# Patient Record
Sex: FEMALE | Race: WHITE
[De-identification: names, ages, dates, MRNs, and addresses within clinical notes are randomized per-mention and may not be internally consistent; named-entity substitution may affect disease eponyms.]

---

## 2017-12-16 ENCOUNTER — HOSPITAL ENCOUNTER (INPATIENT)
Dept: HOSPITAL 61 - 5 NORTH | Age: 57
LOS: 3 days | Discharge: HOME | DRG: 853 | End: 2017-12-19
Attending: INTERNAL MEDICINE | Admitting: INTERNAL MEDICINE
Payer: COMMERCIAL

## 2017-12-16 ENCOUNTER — HOSPITAL ENCOUNTER (EMERGENCY)
Dept: HOSPITAL 63 - ER | Age: 57
Discharge: ANOTHER HOSPITAL | End: 2017-12-16
Payer: COMMERCIAL

## 2017-12-16 VITALS — SYSTOLIC BLOOD PRESSURE: 113 MMHG | DIASTOLIC BLOOD PRESSURE: 54 MMHG

## 2017-12-16 VITALS
SYSTOLIC BLOOD PRESSURE: 131 MMHG | DIASTOLIC BLOOD PRESSURE: 76 MMHG | DIASTOLIC BLOOD PRESSURE: 76 MMHG | DIASTOLIC BLOOD PRESSURE: 76 MMHG | SYSTOLIC BLOOD PRESSURE: 131 MMHG | DIASTOLIC BLOOD PRESSURE: 76 MMHG | SYSTOLIC BLOOD PRESSURE: 131 MMHG | SYSTOLIC BLOOD PRESSURE: 131 MMHG

## 2017-12-16 VITALS — DIASTOLIC BLOOD PRESSURE: 54 MMHG | SYSTOLIC BLOOD PRESSURE: 116 MMHG

## 2017-12-16 VITALS — WEIGHT: 190 LBS | BODY MASS INDEX: 32.44 KG/M2 | HEIGHT: 64 IN

## 2017-12-16 DIAGNOSIS — K85.10: ICD-10-CM

## 2017-12-16 DIAGNOSIS — N20.0: ICD-10-CM

## 2017-12-16 DIAGNOSIS — K80.62: ICD-10-CM

## 2017-12-16 DIAGNOSIS — A41.9: Primary | ICD-10-CM

## 2017-12-16 DIAGNOSIS — R74.0: ICD-10-CM

## 2017-12-16 DIAGNOSIS — K59.00: ICD-10-CM

## 2017-12-16 DIAGNOSIS — K85.10: Primary | ICD-10-CM

## 2017-12-16 LAB
ALBUMIN SERPL-MCNC: 4 G/DL (ref 3.4–5)
ALBUMIN/GLOB SERPL: 1.1 {RATIO} (ref 1–1.7)
ALP SERPL-CCNC: 310 U/L (ref 46–116)
ALT SERPL-CCNC: 758 U/L (ref 14–59)
AMORPH SED URNS QL MICRO: PRESENT /HPF
ANION GAP SERPL CALC-SCNC: 11 MMOL/L (ref 6–14)
APTT PPP: (no result) S
AST SERPL-CCNC: 561 U/L (ref 15–37)
BACTERIA #/AREA URNS HPF: (no result) /HPF
BASOPHILS # BLD AUTO: 0.1 X10^3/UL (ref 0–0.2)
BASOPHILS NFR BLD: 1 % (ref 0–3)
BILIRUB SERPL-MCNC: 4.2 MG/DL (ref 0.2–1)
BILIRUB UR QL STRIP: (no result)
BUN/CREAT SERPL: 10 (ref 6–20)
CA-I SERPL ISE-MCNC: 9 MG/DL (ref 7–20)
CALCIUM SERPL-MCNC: 9.6 MG/DL (ref 8.5–10.1)
CHLORIDE SERPL-SCNC: 106 MMOL/L (ref 98–107)
CO2 SERPL-SCNC: 26 MMOL/L (ref 21–32)
CREAT SERPL-MCNC: 0.9 MG/DL (ref 0.6–1)
EOSINOPHIL NFR BLD: 0.4 X10^3/UL (ref 0–0.7)
EOSINOPHIL NFR BLD: 3 % (ref 0–3)
ERYTHROCYTE [DISTWIDTH] IN BLOOD BY AUTOMATED COUNT: 13.4 % (ref 11.5–14.5)
FIBRINOGEN PPP-MCNC: CLEAR MG/DL
GFR SERPLBLD BASED ON 1.73 SQ M-ARVRAT: 64.8 ML/MIN
GLOBULIN SER-MCNC: 3.7 G/DL (ref 2.2–3.8)
GLUCOSE SERPL-MCNC: 112 MG/DL (ref 70–99)
GLUCOSE UR STRIP-MCNC: (no result) MG/DL
HCT VFR BLD CALC: 44.9 % (ref 36–47)
HGB BLD-MCNC: 15.3 G/DL (ref 12–15.5)
LYMPHOCYTES # BLD: 2 X10^3/UL (ref 1–4.8)
LYMPHOCYTES NFR BLD AUTO: 16 % (ref 24–48)
MCH RBC QN AUTO: 32 PG (ref 25–35)
MCHC RBC AUTO-ENTMCNC: 34 G/DL (ref 31–37)
MCV RBC AUTO: 93 FL (ref 79–100)
MONO #: 1 X10^3/UL (ref 0–1.1)
MONOCYTES NFR BLD: 8 % (ref 0–9)
NEUT #: 9 X10^3UL (ref 1.8–7.7)
NEUTROPHILS NFR BLD AUTO: 72 % (ref 31–73)
NITRITE UR QL STRIP: (no result)
PLATELET # BLD AUTO: 333 X10^3/UL (ref 140–400)
POTASSIUM SERPL-SCNC: 3.8 MMOL/L (ref 3.5–5.1)
PROT SERPL-MCNC: 7.7 G/DL (ref 6.4–8.2)
RBC # BLD AUTO: 4.81 X10^6/UL (ref 3.5–5.4)
RBC #/AREA URNS HPF: (no result) /HPF (ref 0–2)
SODIUM SERPL-SCNC: 143 MMOL/L (ref 136–145)
SP GR UR STRIP: 1.02
SQUAMOUS #/AREA URNS LPF: (no result) /LPF
UROBILINOGEN UR-MCNC: 1 MG/DL
WBC # BLD AUTO: 12.5 X10^3/UL (ref 4–11)

## 2017-12-16 PROCEDURE — 85025 COMPLETE CBC W/AUTO DIFF WBC: CPT

## 2017-12-16 PROCEDURE — 96366 THER/PROPH/DIAG IV INF ADDON: CPT

## 2017-12-16 PROCEDURE — 36415 COLL VENOUS BLD VENIPUNCTURE: CPT

## 2017-12-16 PROCEDURE — 82977 ASSAY OF GGT: CPT

## 2017-12-16 PROCEDURE — S0028 INJECTION, FAMOTIDINE, 20 MG: HCPCS

## 2017-12-16 PROCEDURE — 83690 ASSAY OF LIPASE: CPT

## 2017-12-16 PROCEDURE — 96365 THER/PROPH/DIAG IV INF INIT: CPT

## 2017-12-16 PROCEDURE — 88304 TISSUE EXAM BY PATHOLOGIST: CPT

## 2017-12-16 PROCEDURE — 80053 COMPREHEN METABOLIC PANEL: CPT

## 2017-12-16 PROCEDURE — 87086 URINE CULTURE/COLONY COUNT: CPT

## 2017-12-16 PROCEDURE — 81001 URINALYSIS AUTO W/SCOPE: CPT

## 2017-12-16 PROCEDURE — 80061 LIPID PANEL: CPT

## 2017-12-16 PROCEDURE — 99285 EMERGENCY DEPT VISIT HI MDM: CPT

## 2017-12-16 PROCEDURE — 80048 BASIC METABOLIC PNL TOTAL CA: CPT

## 2017-12-16 PROCEDURE — 74300 X-RAY BILE DUCTS/PANCREAS: CPT

## 2017-12-16 PROCEDURE — 74176 CT ABD & PELVIS W/O CONTRAST: CPT

## 2017-12-16 PROCEDURE — C9113 INJ PANTOPRAZOLE SODIUM, VIA: HCPCS

## 2017-12-16 RX ADMIN — ACETAMINOPHEN PRN MG: 325 TABLET, FILM COATED ORAL at 20:28

## 2017-12-16 RX ADMIN — ENOXAPARIN SODIUM SCH MG: 40 INJECTION SUBCUTANEOUS at 20:28

## 2017-12-16 RX ADMIN — BACITRACIN SCH MLS/HR: 5000 INJECTION, POWDER, FOR SOLUTION INTRAMUSCULAR at 18:07

## 2017-12-16 RX ADMIN — PIPERACILLIN SODIUM AND TAZOBACTAM SODIUM SCH GM: 3; .375 INJECTION, POWDER, FOR SOLUTION INTRAVENOUS at 23:44

## 2017-12-16 RX ADMIN — PIPERACILLIN SODIUM AND TAZOBACTAM SODIUM SCH GM: 3; .375 INJECTION, POWDER, FOR SOLUTION INTRAVENOUS at 18:07

## 2017-12-16 NOTE — RAD
CT abdomen and pelvis without contrast 12/16/2017



Clinical indication: Right lower quadrant abdominal pain. [place] comparison:

None.



Technique: Multiple CT images of the abdomen and pelvis were obtained without

contrast.



PQRS Compliance Statement:



One or more of the following individualized dose reduction techniques were

utilized for this examination:

1. Automated exposure control

2. Adjustment of the mA and/or kV according to patient size

3. Use of iterative reconstruction technique



Findings:

Heart size visualized lung bases are unremarkable.



Evaluation of the solid abdominopelvic viscera, lymphadenopathy and

vasculature is limited in the absence of intravenous contrast.



Unenhanced contours of the liver, spleen, adrenal glands, pancreas and right

kidney are grossly unremarkable. There is a small 1 cm left inferior pole

renal cyst. No hydronephrosis. No nephrolithiasis.



Gallbladder is mildly dilated with cholelithiasis. No intra or extrahepatic

bile or ductal dilatation.



Abdominal aorta is normal in caliber.



Small and large bowel loops are normal in caliber without obstruction. No

abdominal free fluid. Appendix is normal in appearance. No pneumoperitoneum.



Urinary bladder is decompressed. Uterus is present though incompletely

evaluated by CT. No iliac or inguinal lymphadenopathy. No pelvic free fluid.



There is bilateral L5 spondylolysis and grade 2 and shows thesis L5 on S1.



Impression:

1. Mildly dilated gallbladder with cholelithiasis. Findings are equivocal for

acute cholecystitis.

2. No nephrolithiasis or hydronephrosis.

## 2017-12-16 NOTE — PDOC1
History and Physical


Date of Admission


Date of Admission


DATE: 12/16/17 


TIME: 15:44





Identification/Chief Complaint


Chief Complaint


abdomen pain


Problems:  





Source


Source:  Caregiver, Chart review, Patient





History of Present Illness


History of Present Illness


pt presented to the ER at [place] with a few days of abdominal pain,  mid 

abdomen pain, she thought it felt like a kidney problem at first.  She was 

taking ibuprofen with some good benefit to her pain.  Came to the ER as pain 

was not improving much. Mid abd pain, 6/10


CT scan abdomen showed renal stones,  possible justice


Abx given at OSH,  white count up and she reported fever and chills at home


since traveling to this hospital, her pain is much improved, less abd pain


she drinks almost no alcohol,  has no sick contacts,   works nights, but is 

usually very healthy


has had no change in stools





Past Medical History


Cardiovascular:  No pertinent hx


Pulmonary:  No pertinent hx


GI:  No pertinent hx


Heme/Onc:  No pertinent hx


Psych:  No pertinent hx


Rheumatologic:  No pertinent hx


Infectious disease:  No pertinent hx


ENT:  No pertinent hx


Renal/:  No pertinent hx


Endocrine:  No pertinent hx


Dermatology:  No pertinent hx





Past Surgical History


Past Surgical History:  No pertinent history





Family History


Family History:  No Significant





Social History


Smoke:  No


ALCOHOL:  rare


Drugs:  None





Allergies


Allergies:  


Coded Allergies:  


     No Known Drug Allergies (Unverified , 12/16/17)





ROS


General:  YES: Night Sweats, Fatigue, 


   No: Chills, Malaise, Appetite, Other


PSYCHOLOGICAL ROS:  No: Concentration difficultie, Decreased libido, Depression

, Disorientation, Hallucinations, Hostility, Irritablity, Memory difficulties, 

Mood Swings, Obsessive thoughts, Physical abuse, Sexual abuse, Sleep 

disturbances, Suicidal ideation, Other


Eyes:  No Blurry vision, No Decreased vision, No Double vision, No Dry eyes, No 

Excessive tearing, No Eye Pain, No Itchy Eyes, No Loss of vision, No Photophobia

, No Scotomata, No Uses contacts, No Uses glasses, No Other


HEENT:  YES: Heacaches, 


   No: Visual Changes, Hearing change, Nasal congestion, Nasal discharge, Oral 

lesions, Sinus pain, Sore Throat, Epistaxis, Sneezing, Snoring, Tinnitus, 

Vertigo, Vocal changes, Other


Respiratory:  No: Cough, Hemoptysis, Orthopnea, Pleuritic Pain, Shortness of 

breath, SOB with excertion, Sputum Changes, Stridor, Tachypnea, Wheezing, Other


Cardiovascular:  No Chest Pain, No Palpitations, No Orthopnea, No Paroxysmal 

Noc. Dyspnea, No Edema, No Lt Headedness, No Other


Gastrointestinal:  Yes Nausea, Yes Abdominal Pain, 


   No Vomiting, No Diarrhea, No Constipation, No Melena, No Hematochezia, No 

Other


Genitourinary:  No Dysuria, No Frequency, No Incontinence, No Hematuria, No 

Retention, No Discharge, No Urgency, No Pain, No Flank Pain, No Other, No , No 

, No , No , No , No , No 


Musculoskeletal:  No Gait Disturbance, No Joint Pain, No Joint Stiffness, No 

Joint Swelling, No Muscle Pain, No Muscular Weakness, No Pain In:, No Swelling 

In:, No Other


Neurological:  No Behavorial Changes, No Bowel/Bladder ControlChng, No Confusion

, No Dizziness, No Gait Disturbance, No Headaches, No Impaired Coord/balance, 

No Memory Loss, No Numbness/Tingling, No Seizures, No Speech Problems, No 

Tremors, No Visual Changes, No Weakness, No Other


Skin:  No Dry Skin, No Eczema, No Hair Changes, No Lumps, No Mole Changes, No 

Mottling, No Nail Changes, No Pruritus, No Rash, No Skin Lesion Changes, No 

Other, No Acne





Physical Exam


General:  Alert, Oriented X3, Cooperative, No acute distress


HEENT:  Atraumatic, PERRLA, Mucous membr. moist/pink


Lungs:  Clear to auscultation, Normal air movement


Heart:  S1S2, no gallops, no murmurs


Abdomen:  Normal bowel sounds, Soft


Extremities:  No clubbing, No cyanosis, No edema


Skin:  No rashes, No breakdown


Neuro:  Normal speech, Normal tone, Sensation intact, Cranial nerves 3-12 NL


Psych/Mental Status:  Mental status NL, Mood NL





Labs


Labs


WBC 12.5, Hgb 15.3, plt 333





Na 143, K 3.8, Cl 106, C02 26, BUN 9, Cr 0.9, glucose 112


Lipase > 73532,     ,  





Images


Images


CT abd 12/16


Impression:


1. Mildly dilated gallbladder with cholelithiasis. Findings are equivocal for


acute cholecystitis.


2. No nephrolithiasis or hydronephrosis.





VTE Prophylaxis Ordered


VTE Prophylaxis Devices:  No


VTE Pharmacological Prophylaxi:  Yes





Assessment/Plan


Assessment/Plan


acute pancreatitis


possible gallstone pancreatitis, consult GI,  may need MRCP


has been NPO, try clears and IV fluid





white count and fever,   sepsis,  unk. source,  consider cholangitis as that is 

the only problem area,  zosyn started, will continue





consult gen surg.  may benefit from cholecystectomy at some point





admit











BUDDY ROBLERO MD Dec 16, 2017 15:46

## 2017-12-16 NOTE — ED.ADGEN
Adult General


HPI


HPI





Patient is a 57 year old female with right flank pain





Right flank pain for two days.  Also some right lower pain.  There is some pain 

to the back.  No N/V/D.  Did have some chills.  No one else is sick.  No chest 

pain, no cough.  Seen at [facility] and referred here.  Took some OTC pain 

meds. Currently the pain is moderate.  No diffuse muscle pain.





Review of Systems


Review of Systems





Constitutional: Did have some chills


Eyes: Denies change in visual acuity, redness, or eye pain 


HENT: Denies nasal congestion or sore throat 


Respiratory: Denies cough or shortness of breath 


Cardiovascular: No chest pain


GI: HPI


: Denies dysuria or hematuria 


Musculoskeletal: right flank pain


Integument: Denies rash or skin lesions 


Neurologic: Denies headache, focal weakness or sensory changes 


Endocrine: Denies polyuria or polydipsia 





All other systems were reviewed and found to be within normal limits, except as 

documented in this note.





Current Medications


Current Medications





Current Medications








 Medications


  (Trade)  Dose


 Ordered  Sig/Zohreh  Start Time


 Stop Time Status Last Admin


Dose Admin


 


 Morphine Sulfate


  (Morphine 2mg


 Syringe)  2 mg  PRN Q15MIN  PRN  12/16/17 12:00


 12/16/17 15:12 DC  


 


 


 Piperacillin Sod/


 Tazobactam Sod


  (Zosyn)  3.375 gm  STK-MED ONCE  12/16/17 13:19


 12/16/17 13:20 DC  


 


 


 Piperacillin Sod/


 Tazobactam Sod


 3.375 gm/Sodium


 Chloride  50 ml @ 


 100 mls/hr  1X  ONCE  12/16/17 12:45


 12/16/17 13:14 DC 12/16/17 12:45


100 MLS/HR


 


 Sodium Chloride  50 ml @ As


 Directed  STK-MED ONCE  12/16/17 13:19


 12/16/17 13:20 DC  


 











Allergies


Allergies





Allergies








Coded Allergies Type Severity Reaction Last Updated Verified


 


  No Known Drug Allergies    12/16/17 No











Physical Exam


Physical Exam





Constitutional: Well developed, well nourished, no acute distress, non-toxic 

appearance. 


HENT: Normocephalic, atraumatic,


Eyes: PERRLA, EOMI, conjunctiva normal, no discharge. 


Neck: Normal range of motion, no tenderness, supple, no stridor. 


Cardiovascular:Heart rate regular rhythm, no murmur 


Lungs & Thorax:  Bilateral breath sounds clear to auscultati


Abdomen: Bowel sounds normal,mild left flank tenderness, no point RLQ 

tenderness.  Mild epigastric pain.


Skin: Warm, dry, no erythema, no rash. 


Back: No tenderness, no CVA tenderness. 


Extremities: No tenderness, no cyanosis, no clubbing, ROM intact, no edema. 


Neurologic: Alert and oriented X 3, normal motor function, normal sensory 

function, no focal deficits noted. 


Psychologic: Affect normal, judgement normal, mood normal.





Current Patient Data


Vital Signs





 Vital Signs








  Date Time  Temp Pulse Resp B/P (MAP) Pulse Ox O2 Delivery O2 Flow Rate FiO2


 


12/16/17 14:15 97.6 78 16 131/76 (94) 95 Room Air  








Lab Results





 Laboratory Tests








Test


  12/16/17


11:59


 


White Blood Count


  12.5 x10^3/uL


(4.0-11.0)  H


 


Red Blood Count


  4.81 x10^6/uL


(3.50-5.40)


 


Hemoglobin


  15.3 g/dL


(12.0-15.5)


 


Hematocrit


  44.9 %


(36.0-47.0)


 


Mean Corpuscular Volume


  93 fL ()


 


 


Mean Corpuscular Hemoglobin 32 pg (25-35)  


 


Mean Corpuscular Hemoglobin


Concent 34 g/dL


(31-37)


 


Red Cell Distribution Width


  13.4 %


(11.5-14.5)


 


Platelet Count


  333 x10^3/uL


(140-400)


 


Neutrophils (%) (Auto) 72 % (31-73)  


 


Lymphocytes (%) (Auto) 16 % (24-48)  L


 


Monocytes (%) (Auto) 8 % (0-9)  


 


Eosinophils (%) (Auto) 3 % (0-3)  


 


Basophils (%) (Auto) 1 % (0-3)  


 


Neutrophils # (Auto)


  9.0 x10^3uL


(1.8-7.7)  H


 


Lymphocytes # (Auto)


  2.0 x10^3/uL


(1.0-4.8)


 


Monocytes # (Auto)


  1.0 x10^3/uL


(0.0-1.1)


 


Eosinophils # (Auto)


  0.4 x10^3/uL


(0.0-0.7)


 


Basophils # (Auto)


  0.1 x10^3/uL


(0.0-0.2)


 


Urine Collection Type Unknown  


 


Urine Color Angela  


 


Urine Clarity Clear  


 


Urine pH 5.0  


 


Urine Specific Gravity 1.025  


 


Urine Protein


  Neg


(NEG-TRACE)


 


Urine Glucose (UA)


  Neg mg/dL


(NEG)


 


Urine Ketones (Stick)


  15 mg/dL (NEG)


 


 


Urine Blood Neg (NEG)  


 


Urine Nitrite Neg (NEG)  


 


Urine Bilirubin Large (NEG)  


 


Urine Urobilinogen Dipstick


  1 mg/dL (0.2


mg/dL)


 


Urine Leukocyte Esterase Small (NEG)  


 


Urine RBC


  Rare /HPF


(0-2)


 


Urine WBC


  11-20 /HPF


(0-4)


 


Urine Squamous Epithelial


Cells Mod /LPF  


 


 


Urine Transitional Epithelial


Cells Occ /LPF  


 


 


Urine Amorphous Sediment Present /HPF  


 


Urine Bacteria


  Few /HPF


(0-FEW)


 


Urine Mucus Mod /LPF  


 


Sodium Level


  143 mmol/L


(136-145)


 


Potassium Level


  3.8 mmol/L


(3.5-5.1)


 


Chloride Level


  106 mmol/L


()


 


Carbon Dioxide Level


  26 mmol/L


(21-32)


 


Anion Gap 11 (6-14)  


 


Blood Urea Nitrogen


  9 mg/dL (7-20)


 


 


Creatinine


  0.9 mg/dL


(0.6-1.0)


 


Estimated GFR


(Cockcroft-Gault) 64.8  


 


 


BUN/Creatinine Ratio 10 (6-20)  


 


Glucose Level


  112 mg/dL


(70-99)  H


 


Calcium Level


  9.6 mg/dL


(8.5-10.1)


 


Total Bilirubin


  4.2 mg/dL


(0.2-1.0)  H


 


Aspartate Amino Transferase


(AST) 561 U/L


(15-37)  H


 


Alanine Aminotransferase (ALT)


  758 U/L


(14-59)  H


 


Alkaline Phosphatase


  310 U/L


()  H


 


Total Protein


  7.7 g/dL


(6.4-8.2)


 


Albumin


  4.0 g/dL


(3.4-5.0)


 


Albumin/Globulin Ratio 1.1 (1.0-1.7)  


 


Lipase


  73843 U/L


()  H











EKG


EKG


[]





Radiology/Procedures


Radiology/Procedures


PROCEDURE: CT ABDOMEN PELVIS WO CONTRAST





CT abdomen and pelvis without contrast 12/16/2017





Clinical indication: Right lower quadrant abdominal pain. [place] comparison:


None.





Technique: Multiple CT images of the abdomen and pelvis were obtained without


contrast.





PQRS Compliance Statement:





One or more of the following individualized dose reduction techniques were


utilized for this examination:


1. Automated exposure control


2. Adjustment of the mA and/or kV according to patient size


3. Use of iterative reconstruction technique





Findings:


Heart size visualized lung bases are unremarkable.





Evaluation of the solid abdominopelvic viscera, lymphadenopathy and


vasculature is limited in the absence of intravenous contrast.





Unenhanced contours of the liver, spleen, adrenal glands, pancreas and right


kidney are grossly unremarkable. There is a small 1 cm left inferior pole


renal cyst. No hydronephrosis. No nephrolithiasis.





Gallbladder is mildly dilated with cholelithiasis. No intra or extrahepatic


bile or ductal dilatation.





Abdominal aorta is normal in caliber.





Small and large bowel loops are normal in caliber without obstruction. No


abdominal free fluid. Appendix is normal in appearance. No pneumoperitoneum.





Urinary bladder is decompressed. Uterus is present though incompletely


evaluated by CT. No iliac or inguinal lymphadenopathy. No pelvic free fluid.





There is bilateral L5 spondylolysis and grade 2 and shows thesis L5 on S1.





Impression:


1. Mildly dilated gallbladder with cholelithiasis. Findings are equivocal for


acute cholecystitis.


2. No nephrolithiasis or hydronephrosis.














DICTATED AND SIGNED BY:     ROSETTE BRADY MD


DATE:     12/16/17 1222





CC: NICK CAMACHO MD; TOMEKA SUTTON ~


[]





Course & Med Decision Making


Course & Med Decision Making


Pertinent Labs and Imaging studies reviewed. (See chart for details)





Greatly elevated Lipase and moderately elevated LFTs.  Patient has what appears 

to be gallstone pancreatitis.  This will require transfer to [place].  This 

condition exceed the services offered at this hospital.  Patient feels much 

improved and looks rather well -- better than her labs would suggest.  I will 

dose with antibiotics and arrange for transfer for a direct admit to [place],





1245:  D/W Dr. Rizzo who accepts patient for a direct admit at [place];  we 

will arrange transportation.  Transferred to [place] in stable condition.





Dx:  gallstone pancreatitis.





Final Impression


Final Impression


[]Gallstone pancreatitis


Problems:  





Dragon Disclaimer


Dragon Disclaimer


This electronic medical record was generated, in whole or in part, using a 

voice recognition dictation system.











NICK CAMACHO MD Dec 16, 2017 12:01

## 2017-12-17 VITALS — DIASTOLIC BLOOD PRESSURE: 57 MMHG | SYSTOLIC BLOOD PRESSURE: 138 MMHG

## 2017-12-17 VITALS — DIASTOLIC BLOOD PRESSURE: 97 MMHG | SYSTOLIC BLOOD PRESSURE: 145 MMHG

## 2017-12-17 VITALS — DIASTOLIC BLOOD PRESSURE: 49 MMHG | SYSTOLIC BLOOD PRESSURE: 122 MMHG

## 2017-12-17 VITALS — DIASTOLIC BLOOD PRESSURE: 43 MMHG | SYSTOLIC BLOOD PRESSURE: 107 MMHG

## 2017-12-17 VITALS — SYSTOLIC BLOOD PRESSURE: 115 MMHG | DIASTOLIC BLOOD PRESSURE: 50 MMHG

## 2017-12-17 VITALS — SYSTOLIC BLOOD PRESSURE: 120 MMHG | DIASTOLIC BLOOD PRESSURE: 47 MMHG

## 2017-12-17 LAB
ALBUMIN SERPL-MCNC: 3.2 G/DL (ref 3.4–5)
ALBUMIN/GLOB SERPL: 1.1 {RATIO} (ref 1–1.7)
ALP SERPL-CCNC: 265 U/L (ref 46–116)
ALT SERPL-CCNC: 581 U/L (ref 14–59)
ANION GAP SERPL CALC-SCNC: 10 MMOL/L (ref 6–14)
AST SERPL-CCNC: 320 U/L (ref 15–37)
BASOPHILS # BLD AUTO: 0.1 X10^3/UL (ref 0–0.2)
BASOPHILS NFR BLD: 1 % (ref 0–3)
BILIRUB SERPL-MCNC: 2.9 MG/DL (ref 0.2–1)
BUN SERPL-MCNC: 12 MG/DL (ref 7–20)
BUN/CREAT SERPL: 15 (ref 6–20)
CALCIUM SERPL-MCNC: 8.5 MG/DL (ref 8.5–10.1)
CHLORIDE SERPL-SCNC: 109 MMOL/L (ref 98–107)
CHOLEST SERPL-MCNC: 180 MG/DL (ref 0–200)
CHOLEST/HDLC SERPL: 1.7 {RATIO}
CO2 SERPL-SCNC: 24 MMOL/L (ref 21–32)
CREAT SERPL-MCNC: 0.8 MG/DL (ref 0.6–1)
EOSINOPHIL NFR BLD: 6 % (ref 0–3)
ERYTHROCYTE [DISTWIDTH] IN BLOOD BY AUTOMATED COUNT: 13.6 % (ref 11.5–14.5)
GFR SERPLBLD BASED ON 1.73 SQ M-ARVRAT: 74.2 ML/MIN
GLOBULIN SER-MCNC: 2.8 G/DL (ref 2.2–3.8)
GLUCOSE SERPL-MCNC: 99 MG/DL (ref 70–99)
HCT VFR BLD CALC: 39.4 % (ref 36–47)
HDLC SERPL-MCNC: 105 MG/DL (ref 40–60)
HGB BLD-MCNC: 13.3 G/DL (ref 12–15.5)
LYMPHOCYTES # BLD: 2.5 X10^3/UL (ref 1–4.8)
LYMPHOCYTES NFR BLD AUTO: 35 % (ref 24–48)
MCH RBC QN AUTO: 32 PG (ref 25–35)
MCHC RBC AUTO-ENTMCNC: 34 G/DL (ref 31–37)
MCV RBC AUTO: 94 FL (ref 79–100)
MONOCYTES NFR BLD: 8 % (ref 0–9)
NEUTROPHILS NFR BLD AUTO: 50 % (ref 31–73)
NONHDLC SERPL-MCNC: 75 MG/DL (ref 0–129)
PLATELET # BLD AUTO: 284 X10^3/UL (ref 140–400)
POTASSIUM SERPL-SCNC: 4 MMOL/L (ref 3.5–5.1)
PROT SERPL-MCNC: 6 G/DL (ref 6.4–8.2)
RBC # BLD AUTO: 4.2 X10^6/UL (ref 3.5–5.4)
SODIUM SERPL-SCNC: 143 MMOL/L (ref 136–145)
TRIGL SERPL-MCNC: 36 MG/DL (ref 0–150)
WBC # BLD AUTO: 7 X10^3/UL (ref 4–11)

## 2017-12-17 RX ADMIN — BACITRACIN SCH MLS/HR: 5000 INJECTION, POWDER, FOR SOLUTION INTRAMUSCULAR at 03:35

## 2017-12-17 RX ADMIN — PANTOPRAZOLE SODIUM SCH MG: 40 INJECTION, POWDER, FOR SOLUTION INTRAVENOUS at 15:39

## 2017-12-17 RX ADMIN — ACETAMINOPHEN PRN MG: 325 TABLET, FILM COATED ORAL at 07:48

## 2017-12-17 RX ADMIN — BACITRACIN SCH MLS/HR: 5000 INJECTION, POWDER, FOR SOLUTION INTRAMUSCULAR at 23:56

## 2017-12-17 RX ADMIN — ENOXAPARIN SODIUM SCH MG: 40 INJECTION SUBCUTANEOUS at 20:05

## 2017-12-17 RX ADMIN — PIPERACILLIN SODIUM AND TAZOBACTAM SODIUM SCH GM: 3; .375 INJECTION, POWDER, FOR SOLUTION INTRAVENOUS at 05:58

## 2017-12-17 RX ADMIN — PIPERACILLIN SODIUM AND TAZOBACTAM SODIUM SCH GM: 3; .375 INJECTION, POWDER, FOR SOLUTION INTRAVENOUS at 18:29

## 2017-12-17 RX ADMIN — PIPERACILLIN SODIUM AND TAZOBACTAM SODIUM SCH GM: 3; .375 INJECTION, POWDER, FOR SOLUTION INTRAVENOUS at 12:31

## 2017-12-17 RX ADMIN — BACITRACIN SCH MLS/HR: 5000 INJECTION, POWDER, FOR SOLUTION INTRAMUSCULAR at 14:30

## 2017-12-17 RX ADMIN — PIPERACILLIN SODIUM AND TAZOBACTAM SODIUM SCH GM: 3; .375 INJECTION, POWDER, FOR SOLUTION INTRAVENOUS at 23:56

## 2017-12-17 NOTE — PDOC
PROGRESS NOTES


Chief Complaint


Chief Complaint


acute pancreatitis


possible gallstone pancreatitis, consult GI,  may need MRCP


nausea and vomiting and diarrhea,  enteritis, 


white count and fever,   sepsis,  treating cholangitis 


 transaminitis





History of Present Illness


History of Present Illness


lipase down markedly 15 k to 4k


liver enzymes improving, 


Gen surg to see this AM, discussed briefly in room,      cont abx, 


clear liquids Adat





Vitals


Vitals





Vital Signs








  Date Time  Temp Pulse Resp B/P (MAP) Pulse Ox O2 Delivery O2 Flow Rate FiO2


 


12/17/17 08:00      Room Air  


 


12/17/17 07:00 98.2 56 20 145/97 (113) 96   





 98.2       











Physical Exam


General:  Alert, Oriented X3, Cooperative, No acute distress


Heart:  Regular rate


Abdomen:  Normal bowel sounds, Soft


Extremities:  No clubbing, No cyanosis, No edema


Skin:  No rashes, No breakdown





Labs


LABS





Laboratory Tests








Test


  12/17/17


04:55


 


White Blood Count


  7.0 x10^3/uL


(4.0-11.0)


 


Red Blood Count


  4.20 x10^6/uL


(3.50-5.40)


 


Hemoglobin


  13.3 g/dL


(12.0-15.5)


 


Hematocrit


  39.4 %


(36.0-47.0)


 


Mean Corpuscular Volume 94 fL () 


 


Mean Corpuscular Hemoglobin 32 pg (25-35) 


 


Mean Corpuscular Hemoglobin


Concent 34 g/dL


(31-37)


 


Red Cell Distribution Width


  13.6 %


(11.5-14.5)


 


Platelet Count


  284 x10^3/uL


(140-400)


 


Neutrophils (%) (Auto) 50 % (31-73) 


 


Lymphocytes (%) (Auto) 35 % (24-48) 


 


Monocytes (%) (Auto) 8 % (0-9) 


 


Eosinophils (%) (Auto) 6 % (0-3) 


 


Basophils (%) (Auto) 1 % (0-3) 


 


Neutrophils # (Auto)


  3.5 x10^3uL


(1.8-7.7)


 


Lymphocytes # (Auto)


  2.5 x10^3/uL


(1.0-4.8)


 


Monocytes # (Auto)


  0.6 x10^3/uL


(0.0-1.1)


 


Eosinophils # (Auto)


  0.4 x10^3/uL


(0.0-0.7)


 


Basophils # (Auto)


  0.1 x10^3/uL


(0.0-0.2)


 


Sodium Level


  143 mmol/L


(136-145)


 


Potassium Level


  4.0 mmol/L


(3.5-5.1)


 


Chloride Level


  109 mmol/L


()


 


Carbon Dioxide Level


  24 mmol/L


(21-32)


 


Anion Gap 10 (6-14) 


 


Blood Urea Nitrogen


  12 mg/dL


(7-20)


 


Creatinine


  0.8 mg/dL


(0.6-1.0)


 


Estimated GFR


(Cockcroft-Gault) 74.2 


 


 


BUN/Creatinine Ratio 15 (6-20) 


 


Glucose Level


  99 mg/dL


(70-99)


 


Calcium Level


  8.5 mg/dL


(8.5-10.1)


 


Total Bilirubin


  2.9 mg/dL


(0.2-1.0)


 


Gamma Glutamyl Transpeptidase 718 U/L (5-55) 


 


Aspartate Amino Transf


(AST/SGOT) 320 U/L


(15-37)


 


Alanine Aminotransferase


(ALT/SGPT) 581 U/L


(14-59)


 


Alkaline Phosphatase


  265 U/L


()


 


Total Protein


  6.0 g/dL


(6.4-8.2)


 


Albumin


  3.2 g/dL


(3.4-5.0)


 


Albumin/Globulin Ratio 1.1 (1.0-1.7) 


 


Triglycerides Level


  36 mg/dL


(0-150)


 


Cholesterol Level


  180 mg/dL


(0-200)


 


LDL Cholesterol, Calculated


  68 mg/dL


(0-100)


 


VLDL Cholesterol, Calculated 7 mg/dL (0-40) 


 


Non-HDL Cholesterol Calculated


  75 mg/dL


(0-129)


 


HDL Cholesterol


  105 mg/dL


(40-60)


 


Cholesterol/HDL Ratio 1.7 


 


Lipase


  3948 U/L


()











Review of Systems


Review of Systems


nausea and vomiting and diarrhea


abd pain better





Comment


Review of Relevant


I have reviewed the following items marco (where applicable) has been applied.


Labs





Laboratory Tests








Test


  12/17/17


04:55


 


White Blood Count


  7.0 x10^3/uL


(4.0-11.0)


 


Red Blood Count


  4.20 x10^6/uL


(3.50-5.40)


 


Hemoglobin


  13.3 g/dL


(12.0-15.5)


 


Hematocrit


  39.4 %


(36.0-47.0)


 


Mean Corpuscular Volume 94 fL () 


 


Mean Corpuscular Hemoglobin 32 pg (25-35) 


 


Mean Corpuscular Hemoglobin


Concent 34 g/dL


(31-37)


 


Red Cell Distribution Width


  13.6 %


(11.5-14.5)


 


Platelet Count


  284 x10^3/uL


(140-400)


 


Neutrophils (%) (Auto) 50 % (31-73) 


 


Lymphocytes (%) (Auto) 35 % (24-48) 


 


Monocytes (%) (Auto) 8 % (0-9) 


 


Eosinophils (%) (Auto) 6 % (0-3) 


 


Basophils (%) (Auto) 1 % (0-3) 


 


Neutrophils # (Auto)


  3.5 x10^3uL


(1.8-7.7)


 


Lymphocytes # (Auto)


  2.5 x10^3/uL


(1.0-4.8)


 


Monocytes # (Auto)


  0.6 x10^3/uL


(0.0-1.1)


 


Eosinophils # (Auto)


  0.4 x10^3/uL


(0.0-0.7)


 


Basophils # (Auto)


  0.1 x10^3/uL


(0.0-0.2)


 


Sodium Level


  143 mmol/L


(136-145)


 


Potassium Level


  4.0 mmol/L


(3.5-5.1)


 


Chloride Level


  109 mmol/L


()


 


Carbon Dioxide Level


  24 mmol/L


(21-32)


 


Anion Gap 10 (6-14) 


 


Blood Urea Nitrogen


  12 mg/dL


(7-20)


 


Creatinine


  0.8 mg/dL


(0.6-1.0)


 


Estimated GFR


(Cockcroft-Gault) 74.2 


 


 


BUN/Creatinine Ratio 15 (6-20) 


 


Glucose Level


  99 mg/dL


(70-99)


 


Calcium Level


  8.5 mg/dL


(8.5-10.1)


 


Total Bilirubin


  2.9 mg/dL


(0.2-1.0)


 


Gamma Glutamyl Transpeptidase 718 U/L (5-55) 


 


Aspartate Amino Transf


(AST/SGOT) 320 U/L


(15-37)


 


Alanine Aminotransferase


(ALT/SGPT) 581 U/L


(14-59)


 


Alkaline Phosphatase


  265 U/L


()


 


Total Protein


  6.0 g/dL


(6.4-8.2)


 


Albumin


  3.2 g/dL


(3.4-5.0)


 


Albumin/Globulin Ratio 1.1 (1.0-1.7) 


 


Triglycerides Level


  36 mg/dL


(0-150)


 


Cholesterol Level


  180 mg/dL


(0-200)


 


LDL Cholesterol, Calculated


  68 mg/dL


(0-100)


 


VLDL Cholesterol, Calculated 7 mg/dL (0-40) 


 


Non-HDL Cholesterol Calculated


  75 mg/dL


(0-129)


 


HDL Cholesterol


  105 mg/dL


(40-60)


 


Cholesterol/HDL Ratio 1.7 


 


Lipase


  3948 U/L


()








Laboratory Tests








Test


  12/17/17


04:55


 


White Blood Count


  7.0 x10^3/uL


(4.0-11.0)


 


Red Blood Count


  4.20 x10^6/uL


(3.50-5.40)


 


Hemoglobin


  13.3 g/dL


(12.0-15.5)


 


Hematocrit


  39.4 %


(36.0-47.0)


 


Mean Corpuscular Volume 94 fL () 


 


Mean Corpuscular Hemoglobin 32 pg (25-35) 


 


Mean Corpuscular Hemoglobin


Concent 34 g/dL


(31-37)


 


Red Cell Distribution Width


  13.6 %


(11.5-14.5)


 


Platelet Count


  284 x10^3/uL


(140-400)


 


Neutrophils (%) (Auto) 50 % (31-73) 


 


Lymphocytes (%) (Auto) 35 % (24-48) 


 


Monocytes (%) (Auto) 8 % (0-9) 


 


Eosinophils (%) (Auto) 6 % (0-3) 


 


Basophils (%) (Auto) 1 % (0-3) 


 


Neutrophils # (Auto)


  3.5 x10^3uL


(1.8-7.7)


 


Lymphocytes # (Auto)


  2.5 x10^3/uL


(1.0-4.8)


 


Monocytes # (Auto)


  0.6 x10^3/uL


(0.0-1.1)


 


Eosinophils # (Auto)


  0.4 x10^3/uL


(0.0-0.7)


 


Basophils # (Auto)


  0.1 x10^3/uL


(0.0-0.2)


 


Sodium Level


  143 mmol/L


(136-145)


 


Potassium Level


  4.0 mmol/L


(3.5-5.1)


 


Chloride Level


  109 mmol/L


()


 


Carbon Dioxide Level


  24 mmol/L


(21-32)


 


Anion Gap 10 (6-14) 


 


Blood Urea Nitrogen


  12 mg/dL


(7-20)


 


Creatinine


  0.8 mg/dL


(0.6-1.0)


 


Estimated GFR


(Cockcroft-Gault) 74.2 


 


 


BUN/Creatinine Ratio 15 (6-20) 


 


Glucose Level


  99 mg/dL


(70-99)


 


Calcium Level


  8.5 mg/dL


(8.5-10.1)


 


Total Bilirubin


  2.9 mg/dL


(0.2-1.0)


 


Gamma Glutamyl Transpeptidase 718 U/L (5-55) 


 


Aspartate Amino Transf


(AST/SGOT) 320 U/L


(15-37)


 


Alanine Aminotransferase


(ALT/SGPT) 581 U/L


(14-59)


 


Alkaline Phosphatase


  265 U/L


()


 


Total Protein


  6.0 g/dL


(6.4-8.2)


 


Albumin


  3.2 g/dL


(3.4-5.0)


 


Albumin/Globulin Ratio 1.1 (1.0-1.7) 


 


Triglycerides Level


  36 mg/dL


(0-150)


 


Cholesterol Level


  180 mg/dL


(0-200)


 


LDL Cholesterol, Calculated


  68 mg/dL


(0-100)


 


VLDL Cholesterol, Calculated 7 mg/dL (0-40) 


 


Non-HDL Cholesterol Calculated


  75 mg/dL


(0-129)


 


HDL Cholesterol


  105 mg/dL


(40-60)


 


Cholesterol/HDL Ratio 1.7 


 


Lipase


  3948 U/L


()








Medications





Current Medications


Enoxaparin Sodium (Lovenox Per Pharmacy Prophylaxis Dosing) 1 each PRN DAILY  

PRN [facility] SEE COMMENTS;  Start 12/16/17 at 15:45


Oxycodone HCl (Roxicodone) 5 mg PRN Q6HRS  PRN PO PAIN SEVERE;  Start 12/16/17 

at 15:45


Fentanyl Citrate (Fentanyl 2ml Vial) 50 mcg PRN Q2HR  PRN IV PAIN SEVERE;  

Start 12/16/17 at 15:45


Piperacillin Sod/ Tazobactam Sod (Zosyn Per Pharmacy) 1 each PRN DAILY  PRN MC 

SEE COMMENTS;  Start 12/16/17 at 15:45


Enoxaparin Sodium (Lovenox 40mg Syringe) 40 mg Q24H SQ ;  Start 12/16/17 at 21:

00


Piperacillin Sod/ Tazobactam Sod (Zosyn) 3.375 gm Q6HRS IVP  Last administered 

on 12/17/17at 05:58;  Start 12/16/17 at 18:00


Sodium Chloride 1,000 ml @  100 mls/hr Q10H IV  Last administered on 12/17/17at 

03:35;  Start 12/16/17 at 17:15


Saliva Substitute (Biotene Moisturizing Mouth) 2 spray PRN Q15MIN  PRN PO DRY 

MOUTH;  Start 12/16/17 at 17:45


Ondansetron HCl (Zofran) 4 mg PRN Q8HRS  PRN IV NAUSEA/VOMITING 1ST CHOICE;  

Start 12/16/17 at 17:45


Acetaminophen (Tylenol) 650 mg PRN Q6HRS  PRN PO HEADACHE Last administered on 

12/17/17at 07:48;  Start 12/16/17 at 19:45


Vitals/I & O





Vital Sign - Last 24 Hours








 12/16/17 12/16/17 12/16/17 12/16/17





 15:42 19:00 19:42 23:00


 


Temp  98.2  99.7





  98.2  99.7


 


Pulse  70  68


 


Resp  18  18


 


B/P (MAP)  116/54 (74)  113/54 (73)


 


Pulse Ox  94  95


 


O2 Delivery Room Air Room Air Room Air Room Air


 


    





    





 12/17/17 12/17/17 12/17/17 





 03:04 07:00 08:00 


 


Temp 98.1 98.2  





 98.1 98.2  


 


Pulse 61 56  


 


Resp 20 20  


 


B/P (MAP) 107/43 (64) 145/97 (113)  


 


Pulse Ox 96 96  


 


O2 Delivery Room Air Room Air Room Air 














Intake and Output   


 


 12/16/17 12/16/17 12/17/17





 15:00 23:00 07:00


 


Intake Total  120 ml 240 ml


 


Balance  120 ml 240 ml

















BUDDY ROBLERO MD Dec 17, 2017 10:35

## 2017-12-17 NOTE — PDOC
GI PROGRESS NOTES


Date


Date/Time


DATE: 12/17/17 


TIME: 13:41





Subjective


Subjective


Sudden onset periumbilical pain and n/v-  history of dyspepsia and HB in past 

but no pain like this-  abn lfts and liapse at [facility] and Ct with gallstones but 

no CBD dilation- transferred here





Objective


Vitals





Vital Signs








  Date Time  Temp Pulse Resp B/P (MAP) Pulse Ox O2 Delivery O2 Flow Rate FiO2


 


12/17/17 10:42 98.1 55 20 138/57 (84) 95 Room Air  





 98.1       


 


12/17/17 08:00      Room Air  


 


12/17/17 07:00 98.2 56 20 145/97 (113) 96 Room Air  





 98.2       


 


12/17/17 03:04 98.1 61 20 107/43 (64) 96 Room Air  





 98.1       


 


12/16/17 23:00 99.7 68 18 113/54 (73) 95 Room Air  





 99.7       


 


12/16/17 19:42      Room Air  


 


12/16/17 19:00 98.2 70 18 116/54 (74) 94 Room Air  





 98.2       


 


12/16/17 15:42      Room Air  











Labs


Labs





Laboratory Tests








Test


  12/17/17


04:55


 


White Blood Count


  7.0 x10^3/uL


(4.0-11.0)


 


Red Blood Count


  4.20 x10^6/uL


(3.50-5.40)


 


Hemoglobin


  13.3 g/dL


(12.0-15.5)


 


Hematocrit


  39.4 %


(36.0-47.0)


 


Mean Corpuscular Volume 94 fL () 


 


Mean Corpuscular Hemoglobin 32 pg (25-35) 


 


Mean Corpuscular Hemoglobin


Concent 34 g/dL


(31-37)


 


Red Cell Distribution Width


  13.6 %


(11.5-14.5)


 


Platelet Count


  284 x10^3/uL


(140-400)


 


Neutrophils (%) (Auto) 50 % (31-73) 


 


Lymphocytes (%) (Auto) 35 % (24-48) 


 


Monocytes (%) (Auto) 8 % (0-9) 


 


Eosinophils (%) (Auto) 6 % (0-3) 


 


Basophils (%) (Auto) 1 % (0-3) 


 


Neutrophils # (Auto)


  3.5 x10^3uL


(1.8-7.7)


 


Lymphocytes # (Auto)


  2.5 x10^3/uL


(1.0-4.8)


 


Monocytes # (Auto)


  0.6 x10^3/uL


(0.0-1.1)


 


Eosinophils # (Auto)


  0.4 x10^3/uL


(0.0-0.7)


 


Basophils # (Auto)


  0.1 x10^3/uL


(0.0-0.2)


 


Sodium Level


  143 mmol/L


(136-145)


 


Potassium Level


  4.0 mmol/L


(3.5-5.1)


 


Chloride Level


  109 mmol/L


()


 


Carbon Dioxide Level


  24 mmol/L


(21-32)


 


Anion Gap 10 (6-14) 


 


Blood Urea Nitrogen


  12 mg/dL


(7-20)


 


Creatinine


  0.8 mg/dL


(0.6-1.0)


 


Estimated GFR


(Cockcroft-Gault) 74.2 


 


 


BUN/Creatinine Ratio 15 (6-20) 


 


Glucose Level


  99 mg/dL


(70-99)


 


Calcium Level


  8.5 mg/dL


(8.5-10.1)


 


Total Bilirubin


  2.9 mg/dL


(0.2-1.0)


 


Gamma Glutamyl Transpeptidase 718 U/L (5-55) 


 


Aspartate Amino Transf


(AST/SGOT) 320 U/L


(15-37)


 


Alanine Aminotransferase


(ALT/SGPT) 581 U/L


(14-59)


 


Alkaline Phosphatase


  265 U/L


()


 


Total Protein


  6.0 g/dL


(6.4-8.2)


 


Albumin


  3.2 g/dL


(3.4-5.0)


 


Albumin/Globulin Ratio 1.1 (1.0-1.7) 


 


Triglycerides Level


  36 mg/dL


(0-150)


 


Cholesterol Level


  180 mg/dL


(0-200)


 


LDL Cholesterol, Calculated


  68 mg/dL


(0-100)


 


VLDL Cholesterol, Calculated 7 mg/dL (0-40) 


 


Non-HDL Cholesterol Calculated


  75 mg/dL


(0-129)


 


HDL Cholesterol


  105 mg/dL


(40-60)


 


Cholesterol/HDL Ratio 1.7 


 


Lipase


  3948 U/L


()











Assessment


Assessment


Acute pain with elevated lipase and lfts yesterday-  suggestive of passing CBD 

stone-  clinically improved-  pain resolved and labs still abn but improving


Was considering MRCP BUT  I understand surgery has seen her and planning L/C 

tomorrow-  with IOC-  so will hold on MRCP at this time and await results of 

surgery.





Had negative screening colonoscopy a few years ago





Empiric PPI as well





Consult dictated


Problems:  











PHILIP MCGHEE MD Dec 17, 2017 13:47

## 2017-12-17 NOTE — CONS
DATE OF CONSULTATION:  12/17/2017



GI CONSULTATION



CHIEF COMPLAINT:  Periumbilical abdominal pain, abnormal liver function studies

and elevated lipase.



HISTORY OF PRESENT ILLNESS:  This is a 57-year-old white female who is unaware

that she had gallstones, but has had dyspepsia in the past.  She describes some

indigestion and belching after certain foods and some heartburn symptoms, but no

severe pain.  She had the sudden onset of pain yesterday, periumbilical pain,

severe in nature, associated with nausea and some vomiting.  Because of these

symptoms, she went to urgent care where she was told this was not a urinary

infection, which is what she thought, but in fact might be something more

serious and she was sent to [facility] where a CT scan revealed

gallstones without acute cholecystitis, and no evidence of dilated common bile

duct, but elevation in liver function studies and lipase of 15,000 suggest that

she either passed a common bile duct stone or still had one in place.  She was

transferred here for further evaluation last night.  She denied any fever or

chills.  She has had no hematemesis.  She describes intermittent constipation,

but is pretty much her regular bowel pattern.  She had a colonoscopy several

years ago as a screening study and was reportedly negative.



PAST MEDICAL HISTORY:  None is reported.



PAST SURGICAL HISTORY:  None is reported, but did have a screening colonoscopy

several years ago.



SOCIAL HISTORY:  Does not smoke, does not drink, does not use illicit drugs.



FAMILY HISTORY:  Negative for GI related disorders.



REVIEW OF SYSTEMS:

CONSTITUTIONAL:  Other than the pain, denies fevers.  No headache.

RESPIRATORY:  No shortness of breath.  No chest pain or pedal edema.

GENITOURINARY:  No  changes.

NEUROLOGIC:  Denies seizures or paralysis.

MUSCULOSKELETAL:  Denies acute arthritis, arthralgias.



PHYSICAL EXAMINATION:

GENERAL:  She is awake and alert.  She is in no distress.

VITAL SIGNS:  Temperature is max of 99.7, blood pressure 113/54, pulse 68,

respirations are 18.

HEENT:  She is anicteric.

CHEST:  Clear.

HEART:  Regular rate and rhythm.

ABDOMEN:  Bowel sounds are present, soft, nontender, no organomegaly or masses.

RECTAL:  Deferred.

EXTREMITIES:  No cyanosis, clubbing or edema.

NEUROLOGIC:  Alert and oriented.  No gross deficits.



LABORATORY DATA:  At [facility], her lipase was over 15,000 and now 1

day later is 3900.  Her bilirubin was 4.2 yesterday, is now 2.9, so all the labs

appear to be improving.  CT scan revealed gallstones without obstruction or

acute cholecystitis features.



IMPRESSION:

1.  Cholelithiasis based on imaging studies.

2.  Abnormal liver studies, liver tests and lipase with abdominal pain that has

improved.  This is very consistent clinically with passage of a common bile duct

stone and clinically it appears that she has in fact passed the stone.  Her labs

are improving.  Her pain is resolved.  She has planned surgical consultation and

laparoscopic cholecystectomy tomorrow.  Some sort of imaging of the common bile

duct is recommended, but since she is having surgery tomorrow with

intraoperative cholangiogram, we will hold on magnetic resonance

cholangiopancreatography at this point, but that certainly would be another

option if surgery was not forthcoming.



We appreciate the opportunity.  Based on her chronic dyspepsia and heartburn

symptoms, although these are probably related to her gallstones, the possibility

of underlying reflux should be considered and would empirically start her on a

proton pump inhibitor as well and may consider an upper endoscopy in the future.

 



______________________________

PHILIP MCGHEE MD DR:  ANGIE/vinay  JOB#:  [id number] / [id number]

DD:  12/17/2017 13:50  DT:  12/17/2017 22:34

## 2017-12-17 NOTE — PDOC2
CONSULT


Date of Consult


Date of Consult


DATE: 12/17/17 


TIME: 15:58





Reason for Consult


Reason for Consult:


Gallstone pancreatitis





Referring Physician


Referring Physician:


Marsh





Identification/Chief Complaint


Chief Complaint


Epigastric abd pain


Problems:  





Source


Source:  Patient





History of Present Illness


Reason for Visit:


56 yo F with epigastric abd pain, acute onset.  Identified to have evidence of 

gallstone pancreatitis and transferred here.  Feels better today.





Past Medical History


Cardiovascular:  No pertinent hx


Pulmonary:  No pertinent hx


GI:  No pertinent hx


Heme/Onc:  No pertinent hx


Psych:  No pertinent hx


Rheumatologic:  No pertinent hx


Infectious disease:  No pertinent hx


ENT:  No pertinent hx


Renal/:  No pertinent hx


Endocrine:  No pertinent hx


Dermatology:  No pertinent hx





Past Surgical History


Past Surgical History:  No pertinent history





Family History


Family History:  No Significant





Social History


No


ALCOHOL:  rare


Drugs:  None





Current Medications


Current Medications





Current Medications


Enoxaparin Sodium (Lovenox Per Pharmacy Prophylaxis Dosing) 1 each PRN DAILY  

PRN MC SEE COMMENTS;  Start 12/16/17 at 15:45


Oxycodone HCl (Roxicodone) 5 mg PRN Q6HRS  PRN PO PAIN SEVERE;  Start 12/16/17 

at 15:45


Fentanyl Citrate (Fentanyl 2ml Vial) 50 mcg PRN Q2HR  PRN IV PAIN SEVERE;  

Start 12/16/17 at 15:45


Piperacillin Sod/ Tazobactam Sod (Zosyn Per Pharmacy) 1 each PRN DAILY  PRN MC 

SEE COMMENTS;  Start 12/16/17 at 15:45


Enoxaparin Sodium (Lovenox 40mg Syringe) 40 mg Q24H SQ ;  Start 12/16/17 at 21:

00


Piperacillin Sod/ Tazobactam Sod (Zosyn) 3.375 gm Q6HRS IVP  Last administered 

on 12/17/17at 12:31;  Start 12/16/17 at 18:00


Sodium Chloride 1,000 ml @  100 mls/hr Q10H IV  Last administered on 12/17/17at 

14:30;  Start 12/16/17 at 17:15


Saliva Substitute (Biotene Moisturizing Mouth) 2 spray PRN Q15MIN  PRN PO DRY 

MOUTH;  Start 12/16/17 at 17:45


Ondansetron HCl (Zofran) 4 mg PRN Q8HRS  PRN IV NAUSEA/VOMITING 1ST CHOICE;  

Start 12/16/17 at 17:45


Acetaminophen (Tylenol) 650 mg PRN Q6HRS  PRN PO HEADACHE Last administered on 

12/17/17at 07:48;  Start 12/16/17 at 19:45


Pantoprazole Sodium (PROTONIX VIAL for IV PUSH) 40 mg DAILYAC IVP  Last 

administered on 12/17/17at 15:39;  Start 12/17/17 at 14:00





Allergies


Allergies:  


Coded Allergies:  


     No Known Drug Allergies (Unverified , 12/16/17)





ROS


Gastrointestinal:  Yes Abdominal Pain





Physical Exam


General:  Alert, Oriented X3, Cooperative, No acute distress


HEENT:  Atraumatic, EOMI


Lungs:  Normal air movement


Abdomen:  Soft, Other (min epigastric TTP)


Extremities:  No clubbing, No cyanosis


Skin:  No rashes, No breakdown


Neuro:  Normal speech, Sensation intact


Psych/Mental Status:  Mental status NL, Mood NL





Vitals


VITALS





Vital Signs








  Date Time  Temp Pulse Resp B/P (MAP) Pulse Ox O2 Delivery O2 Flow Rate FiO2


 


12/17/17 14:45 98.2 55 18 120/47 (71) 93 Room Air  





 98.2       











Labs


Labs





Laboratory Tests








Test


  12/17/17


04:55


 


White Blood Count


  7.0 x10^3/uL


(4.0-11.0)


 


Red Blood Count


  4.20 x10^6/uL


(3.50-5.40)


 


Hemoglobin


  13.3 g/dL


(12.0-15.5)


 


Hematocrit


  39.4 %


(36.0-47.0)


 


Mean Corpuscular Volume 94 fL () 


 


Mean Corpuscular Hemoglobin 32 pg (25-35) 


 


Mean Corpuscular Hemoglobin


Concent 34 g/dL


(31-37)


 


Red Cell Distribution Width


  13.6 %


(11.5-14.5)


 


Platelet Count


  284 x10^3/uL


(140-400)


 


Neutrophils (%) (Auto) 50 % (31-73) 


 


Lymphocytes (%) (Auto) 35 % (24-48) 


 


Monocytes (%) (Auto) 8 % (0-9) 


 


Eosinophils (%) (Auto) 6 % (0-3) 


 


Basophils (%) (Auto) 1 % (0-3) 


 


Neutrophils # (Auto)


  3.5 x10^3uL


(1.8-7.7)


 


Lymphocytes # (Auto)


  2.5 x10^3/uL


(1.0-4.8)


 


Monocytes # (Auto)


  0.6 x10^3/uL


(0.0-1.1)


 


Eosinophils # (Auto)


  0.4 x10^3/uL


(0.0-0.7)


 


Basophils # (Auto)


  0.1 x10^3/uL


(0.0-0.2)


 


Sodium Level


  143 mmol/L


(136-145)


 


Potassium Level


  4.0 mmol/L


(3.5-5.1)


 


Chloride Level


  109 mmol/L


()


 


Carbon Dioxide Level


  24 mmol/L


(21-32)


 


Anion Gap 10 (6-14) 


 


Blood Urea Nitrogen


  12 mg/dL


(7-20)


 


Creatinine


  0.8 mg/dL


(0.6-1.0)


 


Estimated GFR


(Cockcroft-Gault) 74.2 


 


 


BUN/Creatinine Ratio 15 (6-20) 


 


Glucose Level


  99 mg/dL


(70-99)


 


Calcium Level


  8.5 mg/dL


(8.5-10.1)


 


Total Bilirubin


  2.9 mg/dL


(0.2-1.0)


 


Gamma Glutamyl Transpeptidase 718 U/L (5-55) 


 


Aspartate Amino Transf


(AST/SGOT) 320 U/L


(15-37)


 


Alanine Aminotransferase


(ALT/SGPT) 581 U/L


(14-59)


 


Alkaline Phosphatase


  265 U/L


()


 


Total Protein


  6.0 g/dL


(6.4-8.2)


 


Albumin


  3.2 g/dL


(3.4-5.0)


 


Albumin/Globulin Ratio 1.1 (1.0-1.7) 


 


Triglycerides Level


  36 mg/dL


(0-150)


 


Cholesterol Level


  180 mg/dL


(0-200)


 


LDL Cholesterol, Calculated


  68 mg/dL


(0-100)


 


VLDL Cholesterol, Calculated 7 mg/dL (0-40) 


 


Non-HDL Cholesterol Calculated


  75 mg/dL


(0-129)


 


HDL Cholesterol


  105 mg/dL


(40-60)


 


Cholesterol/HDL Ratio 1.7 


 


Lipase


  3948 U/L


()








Laboratory Tests








Test


  12/17/17


04:55


 


White Blood Count


  7.0 x10^3/uL


(4.0-11.0)


 


Red Blood Count


  4.20 x10^6/uL


(3.50-5.40)


 


Hemoglobin


  13.3 g/dL


(12.0-15.5)


 


Hematocrit


  39.4 %


(36.0-47.0)


 


Mean Corpuscular Volume 94 fL () 


 


Mean Corpuscular Hemoglobin 32 pg (25-35) 


 


Mean Corpuscular Hemoglobin


Concent 34 g/dL


(31-37)


 


Red Cell Distribution Width


  13.6 %


(11.5-14.5)


 


Platelet Count


  284 x10^3/uL


(140-400)


 


Neutrophils (%) (Auto) 50 % (31-73) 


 


Lymphocytes (%) (Auto) 35 % (24-48) 


 


Monocytes (%) (Auto) 8 % (0-9) 


 


Eosinophils (%) (Auto) 6 % (0-3) 


 


Basophils (%) (Auto) 1 % (0-3) 


 


Neutrophils # (Auto)


  3.5 x10^3uL


(1.8-7.7)


 


Lymphocytes # (Auto)


  2.5 x10^3/uL


(1.0-4.8)


 


Monocytes # (Auto)


  0.6 x10^3/uL


(0.0-1.1)


 


Eosinophils # (Auto)


  0.4 x10^3/uL


(0.0-0.7)


 


Basophils # (Auto)


  0.1 x10^3/uL


(0.0-0.2)


 


Sodium Level


  143 mmol/L


(136-145)


 


Potassium Level


  4.0 mmol/L


(3.5-5.1)


 


Chloride Level


  109 mmol/L


()


 


Carbon Dioxide Level


  24 mmol/L


(21-32)


 


Anion Gap 10 (6-14) 


 


Blood Urea Nitrogen


  12 mg/dL


(7-20)


 


Creatinine


  0.8 mg/dL


(0.6-1.0)


 


Estimated GFR


(Cockcroft-Gault) 74.2 


 


 


BUN/Creatinine Ratio 15 (6-20) 


 


Glucose Level


  99 mg/dL


(70-99)


 


Calcium Level


  8.5 mg/dL


(8.5-10.1)


 


Total Bilirubin


  2.9 mg/dL


(0.2-1.0)


 


Gamma Glutamyl Transpeptidase 718 U/L (5-55) 


 


Aspartate Amino Transf


(AST/SGOT) 320 U/L


(15-37)


 


Alanine Aminotransferase


(ALT/SGPT) 581 U/L


(14-59)


 


Alkaline Phosphatase


  265 U/L


()


 


Total Protein


  6.0 g/dL


(6.4-8.2)


 


Albumin


  3.2 g/dL


(3.4-5.0)


 


Albumin/Globulin Ratio 1.1 (1.0-1.7) 


 


Triglycerides Level


  36 mg/dL


(0-150)


 


Cholesterol Level


  180 mg/dL


(0-200)


 


LDL Cholesterol, Calculated


  68 mg/dL


(0-100)


 


VLDL Cholesterol, Calculated 7 mg/dL (0-40) 


 


Non-HDL Cholesterol Calculated


  75 mg/dL


(0-129)


 


HDL Cholesterol


  105 mg/dL


(40-60)


 


Cholesterol/HDL Ratio 1.7 


 


Lipase


  3948 U/L


()











Images


Images


Ct c/w gallstones





Assessment/Plan


Assessment/Plan


Gallstone pancreatitis


will plan laparoscopic cholecystectomy with cholangiogram tomorrow, if 

continued improvement by labs and clinically.  R/B/A d/w pt and pt's family.  

Risks, including, but not limited to:  bleeding, infection, damage to 

surrounding structures, risk of anesthesia, risk of open.  They appear to 

understand, their questions are answered and they agree to proceed.





Thanks for consult!











DAVID LERNER MD Dec 17, 2017 16:02

## 2017-12-18 VITALS — SYSTOLIC BLOOD PRESSURE: 131 MMHG | DIASTOLIC BLOOD PRESSURE: 69 MMHG

## 2017-12-18 VITALS — SYSTOLIC BLOOD PRESSURE: 134 MMHG | DIASTOLIC BLOOD PRESSURE: 57 MMHG

## 2017-12-18 VITALS — SYSTOLIC BLOOD PRESSURE: 113 MMHG | DIASTOLIC BLOOD PRESSURE: 53 MMHG

## 2017-12-18 VITALS — DIASTOLIC BLOOD PRESSURE: 55 MMHG | SYSTOLIC BLOOD PRESSURE: 127 MMHG

## 2017-12-18 VITALS — DIASTOLIC BLOOD PRESSURE: 67 MMHG | SYSTOLIC BLOOD PRESSURE: 144 MMHG

## 2017-12-18 VITALS — SYSTOLIC BLOOD PRESSURE: 138 MMHG | DIASTOLIC BLOOD PRESSURE: 52 MMHG

## 2017-12-18 VITALS — SYSTOLIC BLOOD PRESSURE: 113 MMHG | DIASTOLIC BLOOD PRESSURE: 43 MMHG

## 2017-12-18 VITALS — SYSTOLIC BLOOD PRESSURE: 134 MMHG | DIASTOLIC BLOOD PRESSURE: 69 MMHG

## 2017-12-18 VITALS — DIASTOLIC BLOOD PRESSURE: 59 MMHG | SYSTOLIC BLOOD PRESSURE: 147 MMHG

## 2017-12-18 VITALS — DIASTOLIC BLOOD PRESSURE: 49 MMHG | SYSTOLIC BLOOD PRESSURE: 130 MMHG

## 2017-12-18 VITALS — SYSTOLIC BLOOD PRESSURE: 145 MMHG | DIASTOLIC BLOOD PRESSURE: 77 MMHG

## 2017-12-18 VITALS — SYSTOLIC BLOOD PRESSURE: 128 MMHG | DIASTOLIC BLOOD PRESSURE: 89 MMHG

## 2017-12-18 LAB
ALBUMIN SERPL-MCNC: 2.9 G/DL (ref 3.4–5)
ALBUMIN/GLOB SERPL: 0.9 {RATIO} (ref 1–1.7)
ALP SERPL-CCNC: 215 U/L (ref 46–116)
ALT SERPL-CCNC: 378 U/L (ref 14–59)
ANION GAP SERPL CALC-SCNC: 9 MMOL/L (ref 6–14)
AST SERPL-CCNC: 116 U/L (ref 15–37)
BASOPHILS # BLD AUTO: 0.1 X10^3/UL (ref 0–0.2)
BASOPHILS NFR BLD: 1 % (ref 0–3)
BILIRUB SERPL-MCNC: 1.2 MG/DL (ref 0.2–1)
BUN SERPL-MCNC: 9 MG/DL (ref 7–20)
BUN/CREAT SERPL: 11 (ref 6–20)
CALCIUM SERPL-MCNC: 8.5 MG/DL (ref 8.5–10.1)
CHLORIDE SERPL-SCNC: 110 MMOL/L (ref 98–107)
CO2 SERPL-SCNC: 24 MMOL/L (ref 21–32)
CREAT SERPL-MCNC: 0.8 MG/DL (ref 0.6–1)
EOSINOPHIL NFR BLD: 6 % (ref 0–3)
ERYTHROCYTE [DISTWIDTH] IN BLOOD BY AUTOMATED COUNT: 13.5 % (ref 11.5–14.5)
GFR SERPLBLD BASED ON 1.73 SQ M-ARVRAT: 74.2 ML/MIN
GLOBULIN SER-MCNC: 3.1 G/DL (ref 2.2–3.8)
GLUCOSE SERPL-MCNC: 102 MG/DL (ref 70–99)
HCT VFR BLD CALC: 38.4 % (ref 36–47)
HGB BLD-MCNC: 13 G/DL (ref 12–15.5)
LYMPHOCYTES # BLD: 2.7 X10^3/UL (ref 1–4.8)
LYMPHOCYTES NFR BLD AUTO: 41 % (ref 24–48)
MCH RBC QN AUTO: 31 PG (ref 25–35)
MCHC RBC AUTO-ENTMCNC: 34 G/DL (ref 31–37)
MCV RBC AUTO: 93 FL (ref 79–100)
MONOCYTES NFR BLD: 8 % (ref 0–9)
NEUTROPHILS NFR BLD AUTO: 44 % (ref 31–73)
PLATELET # BLD AUTO: 284 X10^3/UL (ref 140–400)
POTASSIUM SERPL-SCNC: 3.6 MMOL/L (ref 3.5–5.1)
PROT SERPL-MCNC: 6 G/DL (ref 6.4–8.2)
RBC # BLD AUTO: 4.13 X10^6/UL (ref 3.5–5.4)
SODIUM SERPL-SCNC: 143 MMOL/L (ref 136–145)
WBC # BLD AUTO: 6.6 X10^3/UL (ref 4–11)

## 2017-12-18 PROCEDURE — 0FT44ZZ RESECTION OF GALLBLADDER, PERCUTANEOUS ENDOSCOPIC APPROACH: ICD-10-PCS | Performed by: SURGERY

## 2017-12-18 PROCEDURE — BF101ZZ FLUOROSCOPY OF BILE DUCTS USING LOW OSMOLAR CONTRAST: ICD-10-PCS | Performed by: SURGERY

## 2017-12-18 RX ADMIN — SODIUM CHLORIDE, SODIUM LACTATE, POTASSIUM CHLORIDE, AND CALCIUM CHLORIDE SCH MLS/HR: .6; .31; .03; .02 INJECTION, SOLUTION INTRAVENOUS at 18:12

## 2017-12-18 RX ADMIN — DOCUSATE SODIUM SCH MG: 100 CAPSULE, LIQUID FILLED ORAL at 20:36

## 2017-12-18 RX ADMIN — PIPERACILLIN SODIUM AND TAZOBACTAM SODIUM SCH GM: 3; .375 INJECTION, POWDER, FOR SOLUTION INTRAVENOUS at 23:53

## 2017-12-18 RX ADMIN — PIPERACILLIN SODIUM AND TAZOBACTAM SODIUM SCH GM: 3; .375 INJECTION, POWDER, FOR SOLUTION INTRAVENOUS at 06:05

## 2017-12-18 RX ADMIN — PIPERACILLIN SODIUM AND TAZOBACTAM SODIUM SCH GM: 3; .375 INJECTION, POWDER, FOR SOLUTION INTRAVENOUS at 18:22

## 2017-12-18 RX ADMIN — HYDROCODONE BITARTRATE AND ACETAMINOPHEN PRN TAB: 5; 325 TABLET ORAL at 23:52

## 2017-12-18 RX ADMIN — PANTOPRAZOLE SODIUM SCH MG: 40 INJECTION, POWDER, FOR SOLUTION INTRAVENOUS at 11:05

## 2017-12-18 RX ADMIN — BACITRACIN SCH MLS/HR: 5000 INJECTION, POWDER, FOR SOLUTION INTRAMUSCULAR at 15:11

## 2017-12-18 RX ADMIN — BACITRACIN SCH MLS/HR: 5000 INJECTION, POWDER, FOR SOLUTION INTRAMUSCULAR at 11:05

## 2017-12-18 RX ADMIN — HYDROCODONE BITARTRATE AND ACETAMINOPHEN PRN TAB: 5; 325 TABLET ORAL at 18:17

## 2017-12-18 RX ADMIN — ENOXAPARIN SODIUM SCH MG: 40 INJECTION SUBCUTANEOUS at 20:36

## 2017-12-18 RX ADMIN — PIPERACILLIN SODIUM AND TAZOBACTAM SODIUM SCH GM: 3; .375 INJECTION, POWDER, FOR SOLUTION INTRAVENOUS at 13:49

## 2017-12-18 NOTE — PDOC
JASPREET DAI APRN 12/18/17 0947:


SURGICAL PROGRESS NOTE


Subjective


no pain


questions answered


Vital Signs





Vital Signs








  Date Time  Temp Pulse Resp B/P (MAP) Pulse Ox O2 Delivery O2 Flow Rate FiO2


 


12/18/17 07:10 97.7 57 18 130/49 (76) 96 Room Air  





 97.7       








I&O











Intake and Output 


 


 12/18/17





 06:59


 


Intake Total 320 ml


 


Output Total 3 ml


 


Balance 317 ml


 


 


 


Intake Oral 320 ml


 


Output Stool Total 3 ml


 


# Voids 6








General:  Alert, Oriented X3, Cooperative, No acute distress


Abdomen:  Soft, No tenderness


Labs





Laboratory Tests








Test


  12/17/17


04:55 12/18/17


04:00


 


White Blood Count


  7.0 x10^3/uL


(4.0-11.0) 6.6 x10^3/uL


(4.0-11.0)


 


Red Blood Count


  4.20 x10^6/uL


(3.50-5.40) 4.13 x10^6/uL


(3.50-5.40)


 


Hemoglobin


  13.3 g/dL


(12.0-15.5) 13.0 g/dL


(12.0-15.5)


 


Hematocrit


  39.4 %


(36.0-47.0) 38.4 %


(36.0-47.0)


 


Mean Corpuscular Volume 94 fL ()  93 fL () 


 


Mean Corpuscular Hemoglobin 32 pg (25-35)  31 pg (25-35) 


 


Mean Corpuscular Hemoglobin


Concent 34 g/dL


(31-37) 34 g/dL


(31-37)


 


Red Cell Distribution Width


  13.6 %


(11.5-14.5) 13.5 %


(11.5-14.5)


 


Platelet Count


  284 x10^3/uL


(140-400) 284 x10^3/uL


(140-400)


 


Neutrophils (%) (Auto) 50 % (31-73)  44 % (31-73) 


 


Lymphocytes (%) (Auto) 35 % (24-48)  41 % (24-48) 


 


Monocytes (%) (Auto) 8 % (0-9)  8 % (0-9) 


 


Eosinophils (%) (Auto) 6 % (0-3)  6 % (0-3) 


 


Basophils (%) (Auto) 1 % (0-3)  1 % (0-3) 


 


Neutrophils # (Auto)


  3.5 x10^3uL


(1.8-7.7) 2.9 x10^3uL


(1.8-7.7)


 


Lymphocytes # (Auto)


  2.5 x10^3/uL


(1.0-4.8) 2.7 x10^3/uL


(1.0-4.8)


 


Monocytes # (Auto)


  0.6 x10^3/uL


(0.0-1.1) 0.5 x10^3/uL


(0.0-1.1)


 


Eosinophils # (Auto)


  0.4 x10^3/uL


(0.0-0.7) 0.4 x10^3/uL


(0.0-0.7)


 


Basophils # (Auto)


  0.1 x10^3/uL


(0.0-0.2) 0.1 x10^3/uL


(0.0-0.2)


 


Sodium Level


  143 mmol/L


(136-145) 143 mmol/L


(136-145)


 


Potassium Level


  4.0 mmol/L


(3.5-5.1) 3.6 mmol/L


(3.5-5.1)


 


Chloride Level


  109 mmol/L


() 110 mmol/L


()


 


Carbon Dioxide Level


  24 mmol/L


(21-32) 24 mmol/L


(21-32)


 


Anion Gap 10 (6-14)  9 (6-14) 


 


Blood Urea Nitrogen


  12 mg/dL


(7-20) 9 mg/dL (7-20) 


 


 


Creatinine


  0.8 mg/dL


(0.6-1.0) 0.8 mg/dL


(0.6-1.0)


 


Estimated GFR


(Cockcroft-Gault) 74.2 


  74.2 


 


 


BUN/Creatinine Ratio 15 (6-20)  11 (6-20) 


 


Glucose Level


  99 mg/dL


(70-99) 102 mg/dL


(70-99)


 


Calcium Level


  8.5 mg/dL


(8.5-10.1) 8.5 mg/dL


(8.5-10.1)


 


Total Bilirubin


  2.9 mg/dL


(0.2-1.0) 1.2 mg/dL


(0.2-1.0)


 


Gamma Glutamyl Transpeptidase 718 U/L (5-55)  


 


Aspartate Amino Transf


(AST/SGOT) 320 U/L


(15-37) 116 U/L


(15-37)


 


Alanine Aminotransferase


(ALT/SGPT) 581 U/L


(14-59) 378 U/L


(14-59)


 


Alkaline Phosphatase


  265 U/L


() 215 U/L


()


 


Total Protein


  6.0 g/dL


(6.4-8.2) 6.0 g/dL


(6.4-8.2)


 


Albumin


  3.2 g/dL


(3.4-5.0) 2.9 g/dL


(3.4-5.0)


 


Albumin/Globulin Ratio 1.1 (1.0-1.7)  0.9 (1.0-1.7) 


 


Triglycerides Level


  36 mg/dL


(0-150) 


 


 


Cholesterol Level


  180 mg/dL


(0-200) 


 


 


LDL Cholesterol, Calculated


  68 mg/dL


(0-100) 


 


 


VLDL Cholesterol, Calculated 7 mg/dL (0-40)  


 


Non-HDL Cholesterol Calculated


  75 mg/dL


(0-129) 


 


 


HDL Cholesterol


  105 mg/dL


(40-60) 


 


 


Cholesterol/HDL Ratio 1.7  


 


Lipase


  3948 U/L


() 380 U/L


()








Laboratory Tests








Test


  12/18/17


04:00


 


White Blood Count


  6.6 x10^3/uL


(4.0-11.0)


 


Red Blood Count


  4.13 x10^6/uL


(3.50-5.40)


 


Hemoglobin


  13.0 g/dL


(12.0-15.5)


 


Hematocrit


  38.4 %


(36.0-47.0)


 


Mean Corpuscular Volume 93 fL () 


 


Mean Corpuscular Hemoglobin 31 pg (25-35) 


 


Mean Corpuscular Hemoglobin


Concent 34 g/dL


(31-37)


 


Red Cell Distribution Width


  13.5 %


(11.5-14.5)


 


Platelet Count


  284 x10^3/uL


(140-400)


 


Neutrophils (%) (Auto) 44 % (31-73) 


 


Lymphocytes (%) (Auto) 41 % (24-48) 


 


Monocytes (%) (Auto) 8 % (0-9) 


 


Eosinophils (%) (Auto) 6 % (0-3) 


 


Basophils (%) (Auto) 1 % (0-3) 


 


Neutrophils # (Auto)


  2.9 x10^3uL


(1.8-7.7)


 


Lymphocytes # (Auto)


  2.7 x10^3/uL


(1.0-4.8)


 


Monocytes # (Auto)


  0.5 x10^3/uL


(0.0-1.1)


 


Eosinophils # (Auto)


  0.4 x10^3/uL


(0.0-0.7)


 


Basophils # (Auto)


  0.1 x10^3/uL


(0.0-0.2)


 


Sodium Level


  143 mmol/L


(136-145)


 


Potassium Level


  3.6 mmol/L


(3.5-5.1)


 


Chloride Level


  110 mmol/L


()


 


Carbon Dioxide Level


  24 mmol/L


(21-32)


 


Anion Gap 9 (6-14) 


 


Blood Urea Nitrogen 9 mg/dL (7-20) 


 


Creatinine


  0.8 mg/dL


(0.6-1.0)


 


Estimated GFR


(Cockcroft-Gault) 74.2 


 


 


BUN/Creatinine Ratio 11 (6-20) 


 


Glucose Level


  102 mg/dL


(70-99)


 


Calcium Level


  8.5 mg/dL


(8.5-10.1)


 


Total Bilirubin


  1.2 mg/dL


(0.2-1.0)


 


Aspartate Amino Transf


(AST/SGOT) 116 U/L


(15-37)


 


Alanine Aminotransferase


(ALT/SGPT) 378 U/L


(14-59)


 


Alkaline Phosphatase


  215 U/L


()


 


Total Protein


  6.0 g/dL


(6.4-8.2)


 


Albumin


  2.9 g/dL


(3.4-5.0)


 


Albumin/Globulin Ratio 0.9 (1.0-1.7) 


 


Lipase


  380 U/L


()








Problem List


gs pancreatitis


labs improved, no pain


OR today for lap justice


Problems:  





DAVID LERNER MD 12/18/17 1228:


SURGICAL PROGRESS NOTE


Assessment/Plan


TO OR for lap justice with gram


R/B/A d/w pt and pt's family.


They appear to understand, questions are answered and they agree to proceed.


Problems:  











MALAIKAJASPREET L APRN Dec 18, 2017 09:47


DAVID LERNER MD Dec 18, 2017 12:28

## 2017-12-18 NOTE — PDOC4
OPERATIVE NOTE


Date:


Date:  Dec 18, 2017





Pre-Op Diagnosis:


gallstone pancreatitis





Post-Op Diagnosis:


same





Procedure Performed:


Laparoscopic cholecystectomy with cholangiogram





Surgeon:


Wally Lerner





Anesthesia Type:


GETA plus 0.5% marcaine





Blood Loss:


50





Specimans Obtained:


gallbladder





Findings:


indurated gallbladder, gallstones in cystic duct, normal cholangiogram





Complications:


none





Operative Note:


After obtaining informed consent, patient was taken to the OR, induced under 

GETA, and prepped in the usual fashion.  5 mm port placed in supraumbilical and 

RUQ, with 12 port placed in epigastric, all under laparoscopic guidance.  

Abdominal cavity explored and otherwise unremarkable.  Gallbladder noted to be 

edematous.  Gallbladder grasped and triangle exposed.  Critical view was 

obtained, which demonstrated cystic duct and artery as only structures going 

into the gallbladder.  Cholangiogram obtained and unremarkable.  Multiple 

stones evacuated from the cystic duct.  Cystic duct ligated with clips and 

hemolok.  Gallbladder taken off the fossa using cautery, placed in a bag, 

delivered and sent to pathology.  Copious irrigation.  No evidence of bleeding 

or other pathology.  Ports removed and no evidence of bleeding.  Fascia 

repaired with 0 vicryl. Skin repaired with 4 0 monocryl.  Dressing applied.  

All counts correct.  No evidence of immediate complications.











WALLY LERNER MD Dec 18, 2017 15:25

## 2017-12-18 NOTE — PDOC
Subjective:


Subjective:


No pain.





Objective:


Vital Signs:





 Vital Signs








  Date Time  Temp Pulse Resp B/P (MAP) Pulse Ox O2 Delivery O2 Flow Rate FiO2


 


12/18/17 07:10 97.7 57 18 130/49 (76) 96 Room Air  





 97.7       








Labs:





Laboratory Tests








Test


  12/18/17


04:00


 


White Blood Count 6.6 x10^3/uL 


 


Red Blood Count 4.13 x10^6/uL 


 


Hemoglobin 13.0 g/dL 


 


Hematocrit 38.4 % 


 


Mean Corpuscular Volume 93 fL 


 


Mean Corpuscular Hemoglobin 31 pg 


 


Mean Corpuscular Hemoglobin


Concent 34 g/dL 


 


 


Red Cell Distribution Width 13.5 % 


 


Platelet Count 284 x10^3/uL 


 


Neutrophils (%) (Auto) 44 % 


 


Lymphocytes (%) (Auto) 41 % 


 


Monocytes (%) (Auto) 8 % 


 


Eosinophils (%) (Auto) 6 % 


 


Basophils (%) (Auto) 1 % 


 


Neutrophils # (Auto) 2.9 x10^3uL 


 


Lymphocytes # (Auto) 2.7 x10^3/uL 


 


Monocytes # (Auto) 0.5 x10^3/uL 


 


Eosinophils # (Auto) 0.4 x10^3/uL 


 


Basophils # (Auto) 0.1 x10^3/uL 


 


Sodium Level 143 mmol/L 


 


Potassium Level 3.6 mmol/L 


 


Chloride Level 110 mmol/L 


 


Carbon Dioxide Level 24 mmol/L 


 


Anion Gap 9 


 


Blood Urea Nitrogen 9 mg/dL 


 


Creatinine 0.8 mg/dL 


 


Estimated GFR


(Cockcroft-Gault) 74.2 


 


 


BUN/Creatinine Ratio 11 


 


Glucose Level 102 mg/dL 


 


Calcium Level 8.5 mg/dL 


 


Total Bilirubin 1.2 mg/dL 


 


Aspartate Amino Transf


(AST/SGOT) 116 U/L 


 


 


Alanine Aminotransferase


(ALT/SGPT) 378 U/L 


 


 


Alkaline Phosphatase 215 U/L 


 


Total Protein 6.0 g/dL 


 


Albumin 2.9 g/dL 


 


Albumin/Globulin Ratio 0.9 


 


Lipase 380 U/L 











PE:





GEN: NAD


LUNGS: CTAB


HEART: RRR


ABD: S/ND/NT


NEURO/PSYCH: A & O 3





A/P:


Gallstone pancreatitis


Elevated lipase (resolved), elevated LFTs (improved)





--


Await surgery, IOC.











GUSTAVO CABELLO Dec 18, 2017 09:55

## 2017-12-18 NOTE — RAD
Intraoperative cholangiogram 12/18/2017



Clinical history: Left laparoscopic cholecystectomy.



An intraoperative cholangiogram was performed. The total fluoroscopic time is

listed as 9.8 seconds. Two Digital spot radiographs of the right upper

quadrant of the abdomen were obtained. These images demonstrate contrast

opacifying the cystic duct remnant, common hepatic duct, the left and right

hepatic ducts and the common bile duct. These ducts are normal in caliber.

Free spillage of contrast into the duodenum is noted.



Impression: Negative study.

## 2017-12-18 NOTE — PDOC
PROGRESS NOTES


Chief Complaint


Chief Complaint


Acute pancreatitis


Possible gallstone pancreatitis


Nausea and vomiting and diarrhea


Cholangitis





History of Present Illness


History of Present Illness


VSS


Patient preparing for surgery today - lap justice


Family present


Discussed with nurse


Abdomen normal 


Liver enzymes improving,





Vitals


Vitals





Vital Signs








  Date Time  Temp Pulse Resp B/P (MAP) Pulse Ox O2 Delivery O2 Flow Rate FiO2


 


12/18/17 12:22 99.0 57 16 161/71 94 Room Air  





 99.0       











Physical Exam


General:  Alert, Oriented X3, Cooperative, No acute distress


Heart:  Regular rate, Normal S1, Normal S2


Abdomen:  Soft, No tenderness


Extremities:  No clubbing, No cyanosis


Skin:  No rashes, No breakdown





Labs


LABS





Laboratory Tests








Test


  12/18/17


04:00


 


White Blood Count


  6.6 x10^3/uL


(4.0-11.0)


 


Red Blood Count


  4.13 x10^6/uL


(3.50-5.40)


 


Hemoglobin


  13.0 g/dL


(12.0-15.5)


 


Hematocrit


  38.4 %


(36.0-47.0)


 


Mean Corpuscular Volume 93 fL () 


 


Mean Corpuscular Hemoglobin 31 pg (25-35) 


 


Mean Corpuscular Hemoglobin


Concent 34 g/dL


(31-37)


 


Red Cell Distribution Width


  13.5 %


(11.5-14.5)


 


Platelet Count


  284 x10^3/uL


(140-400)


 


Neutrophils (%) (Auto) 44 % (31-73) 


 


Lymphocytes (%) (Auto) 41 % (24-48) 


 


Monocytes (%) (Auto) 8 % (0-9) 


 


Eosinophils (%) (Auto) 6 % (0-3) 


 


Basophils (%) (Auto) 1 % (0-3) 


 


Neutrophils # (Auto)


  2.9 x10^3uL


(1.8-7.7)


 


Lymphocytes # (Auto)


  2.7 x10^3/uL


(1.0-4.8)


 


Monocytes # (Auto)


  0.5 x10^3/uL


(0.0-1.1)


 


Eosinophils # (Auto)


  0.4 x10^3/uL


(0.0-0.7)


 


Basophils # (Auto)


  0.1 x10^3/uL


(0.0-0.2)


 


Sodium Level


  143 mmol/L


(136-145)


 


Potassium Level


  3.6 mmol/L


(3.5-5.1)


 


Chloride Level


  110 mmol/L


()


 


Carbon Dioxide Level


  24 mmol/L


(21-32)


 


Anion Gap 9 (6-14) 


 


Blood Urea Nitrogen 9 mg/dL (7-20) 


 


Creatinine


  0.8 mg/dL


(0.6-1.0)


 


Estimated GFR


(Cockcroft-Gault) 74.2 


 


 


BUN/Creatinine Ratio 11 (6-20) 


 


Glucose Level


  102 mg/dL


(70-99)


 


Calcium Level


  8.5 mg/dL


(8.5-10.1)


 


Total Bilirubin


  1.2 mg/dL


(0.2-1.0)


 


Aspartate Amino Transf


(AST/SGOT) 116 U/L


(15-37)


 


Alanine Aminotransferase


(ALT/SGPT) 378 U/L


(14-59)


 


Alkaline Phosphatase


  215 U/L


()


 


Total Protein


  6.0 g/dL


(6.4-8.2)


 


Albumin


  2.9 g/dL


(3.4-5.0)


 


Albumin/Globulin Ratio 0.9 (1.0-1.7) 


 


Lipase


  380 U/L


()











Review of Systems


Review of Systems


Denies SOA or dyspnea


A&O x3





Assessment and Plan


Assessmemt and Plan


Assessment


Acute pancreatitis


Possible gallstone pancreatitis


Nausea and vomiting and diarrhea


Cholangitis





Plan


Patient is preparing for surgery today


Continue medications 


Recheck labs


PT/OT


Appreciate specialty input


Problems:  





Comment


Review of Relevant


I have reviewed the following items marco (where applicable) has been applied.


Labs





Laboratory Tests








Test


  12/17/17


04:55 12/18/17


04:00


 


White Blood Count


  7.0 x10^3/uL


(4.0-11.0) 6.6 x10^3/uL


(4.0-11.0)


 


Red Blood Count


  4.20 x10^6/uL


(3.50-5.40) 4.13 x10^6/uL


(3.50-5.40)


 


Hemoglobin


  13.3 g/dL


(12.0-15.5) 13.0 g/dL


(12.0-15.5)


 


Hematocrit


  39.4 %


(36.0-47.0) 38.4 %


(36.0-47.0)


 


Mean Corpuscular Volume 94 fL ()  93 fL () 


 


Mean Corpuscular Hemoglobin 32 pg (25-35)  31 pg (25-35) 


 


Mean Corpuscular Hemoglobin


Concent 34 g/dL


(31-37) 34 g/dL


(31-37)


 


Red Cell Distribution Width


  13.6 %


(11.5-14.5) 13.5 %


(11.5-14.5)


 


Platelet Count


  284 x10^3/uL


(140-400) 284 x10^3/uL


(140-400)


 


Neutrophils (%) (Auto) 50 % (31-73)  44 % (31-73) 


 


Lymphocytes (%) (Auto) 35 % (24-48)  41 % (24-48) 


 


Monocytes (%) (Auto) 8 % (0-9)  8 % (0-9) 


 


Eosinophils (%) (Auto) 6 % (0-3)  6 % (0-3) 


 


Basophils (%) (Auto) 1 % (0-3)  1 % (0-3) 


 


Neutrophils # (Auto)


  3.5 x10^3uL


(1.8-7.7) 2.9 x10^3uL


(1.8-7.7)


 


Lymphocytes # (Auto)


  2.5 x10^3/uL


(1.0-4.8) 2.7 x10^3/uL


(1.0-4.8)


 


Monocytes # (Auto)


  0.6 x10^3/uL


(0.0-1.1) 0.5 x10^3/uL


(0.0-1.1)


 


Eosinophils # (Auto)


  0.4 x10^3/uL


(0.0-0.7) 0.4 x10^3/uL


(0.0-0.7)


 


Basophils # (Auto)


  0.1 x10^3/uL


(0.0-0.2) 0.1 x10^3/uL


(0.0-0.2)


 


Sodium Level


  143 mmol/L


(136-145) 143 mmol/L


(136-145)


 


Potassium Level


  4.0 mmol/L


(3.5-5.1) 3.6 mmol/L


(3.5-5.1)


 


Chloride Level


  109 mmol/L


() 110 mmol/L


()


 


Carbon Dioxide Level


  24 mmol/L


(21-32) 24 mmol/L


(21-32)


 


Anion Gap 10 (6-14)  9 (6-14) 


 


Blood Urea Nitrogen


  12 mg/dL


(7-20) 9 mg/dL (7-20) 


 


 


Creatinine


  0.8 mg/dL


(0.6-1.0) 0.8 mg/dL


(0.6-1.0)


 


Estimated GFR


(Cockcroft-Gault) 74.2 


  74.2 


 


 


BUN/Creatinine Ratio 15 (6-20)  11 (6-20) 


 


Glucose Level


  99 mg/dL


(70-99) 102 mg/dL


(70-99)


 


Calcium Level


  8.5 mg/dL


(8.5-10.1) 8.5 mg/dL


(8.5-10.1)


 


Total Bilirubin


  2.9 mg/dL


(0.2-1.0) 1.2 mg/dL


(0.2-1.0)


 


Gamma Glutamyl Transpeptidase 718 U/L (5-55)  


 


Aspartate Amino Transf


(AST/SGOT) 320 U/L


(15-37) 116 U/L


(15-37)


 


Alanine Aminotransferase


(ALT/SGPT) 581 U/L


(14-59) 378 U/L


(14-59)


 


Alkaline Phosphatase


  265 U/L


() 215 U/L


()


 


Total Protein


  6.0 g/dL


(6.4-8.2) 6.0 g/dL


(6.4-8.2)


 


Albumin


  3.2 g/dL


(3.4-5.0) 2.9 g/dL


(3.4-5.0)


 


Albumin/Globulin Ratio 1.1 (1.0-1.7)  0.9 (1.0-1.7) 


 


Triglycerides Level


  36 mg/dL


(0-150) 


 


 


Cholesterol Level


  180 mg/dL


(0-200) 


 


 


LDL Cholesterol, Calculated


  68 mg/dL


(0-100) 


 


 


VLDL Cholesterol, Calculated 7 mg/dL (0-40)  


 


Non-HDL Cholesterol Calculated


  75 mg/dL


(0-129) 


 


 


HDL Cholesterol


  105 mg/dL


(40-60) 


 


 


Cholesterol/HDL Ratio 1.7  


 


Lipase


  3948 U/L


() 380 U/L


()








Laboratory Tests








Test


  12/18/17


04:00


 


White Blood Count


  6.6 x10^3/uL


(4.0-11.0)


 


Red Blood Count


  4.13 x10^6/uL


(3.50-5.40)


 


Hemoglobin


  13.0 g/dL


(12.0-15.5)


 


Hematocrit


  38.4 %


(36.0-47.0)


 


Mean Corpuscular Volume 93 fL () 


 


Mean Corpuscular Hemoglobin 31 pg (25-35) 


 


Mean Corpuscular Hemoglobin


Concent 34 g/dL


(31-37)


 


Red Cell Distribution Width


  13.5 %


(11.5-14.5)


 


Platelet Count


  284 x10^3/uL


(140-400)


 


Neutrophils (%) (Auto) 44 % (31-73) 


 


Lymphocytes (%) (Auto) 41 % (24-48) 


 


Monocytes (%) (Auto) 8 % (0-9) 


 


Eosinophils (%) (Auto) 6 % (0-3) 


 


Basophils (%) (Auto) 1 % (0-3) 


 


Neutrophils # (Auto)


  2.9 x10^3uL


(1.8-7.7)


 


Lymphocytes # (Auto)


  2.7 x10^3/uL


(1.0-4.8)


 


Monocytes # (Auto)


  0.5 x10^3/uL


(0.0-1.1)


 


Eosinophils # (Auto)


  0.4 x10^3/uL


(0.0-0.7)


 


Basophils # (Auto)


  0.1 x10^3/uL


(0.0-0.2)


 


Sodium Level


  143 mmol/L


(136-145)


 


Potassium Level


  3.6 mmol/L


(3.5-5.1)


 


Chloride Level


  110 mmol/L


()


 


Carbon Dioxide Level


  24 mmol/L


(21-32)


 


Anion Gap 9 (6-14) 


 


Blood Urea Nitrogen 9 mg/dL (7-20) 


 


Creatinine


  0.8 mg/dL


(0.6-1.0)


 


Estimated GFR


(Cockcroft-Gault) 74.2 


 


 


BUN/Creatinine Ratio 11 (6-20) 


 


Glucose Level


  102 mg/dL


(70-99)


 


Calcium Level


  8.5 mg/dL


(8.5-10.1)


 


Total Bilirubin


  1.2 mg/dL


(0.2-1.0)


 


Aspartate Amino Transf


(AST/SGOT) 116 U/L


(15-37)


 


Alanine Aminotransferase


(ALT/SGPT) 378 U/L


(14-59)


 


Alkaline Phosphatase


  215 U/L


()


 


Total Protein


  6.0 g/dL


(6.4-8.2)


 


Albumin


  2.9 g/dL


(3.4-5.0)


 


Albumin/Globulin Ratio 0.9 (1.0-1.7) 


 


Lipase


  380 U/L


()








Medications





Current Medications


Enoxaparin Sodium (Lovenox Per Pharmacy Prophylaxis Dosing) 1 each PRN DAILY  

PRN MC SEE COMMENTS;  Start 12/16/17 at 15:45


Oxycodone HCl (Roxicodone) 5 mg PRN Q6HRS  PRN PO PAIN SEVERE;  Start 12/16/17 

at 15:45


Fentanyl Citrate (Fentanyl 2ml Vial) 50 mcg PRN Q2HR  PRN IV PAIN SEVERE;  

Start 12/16/17 at 15:45


Piperacillin Sod/ Tazobactam Sod (Zosyn Per Pharmacy) 1 each PRN DAILY  PRN MC 

SEE COMMENTS;  Start 12/16/17 at 15:45


Enoxaparin Sodium (Lovenox 40mg Syringe) 40 mg Q24H SQ ;  Start 12/16/17 at 21:

00


Piperacillin Sod/ Tazobactam Sod (Zosyn) 3.375 gm Q6HRS IVP  Last administered 

on 12/18/17at 06:05;  Start 12/16/17 at 18:00


Sodium Chloride 1,000 ml @  100 mls/hr Q10H IV  Last administered on 12/18/17at 

11:05;  Start 12/16/17 at 17:15


Saliva Substitute (Biotene Moisturizing Mouth) 2 spray PRN Q15MIN  PRN PO DRY 

MOUTH;  Start 12/16/17 at 17:45


Ondansetron HCl (Zofran) 4 mg PRN Q8HRS  PRN IV NAUSEA/VOMITING 1ST CHOICE;  

Start 12/16/17 at 17:45


Acetaminophen (Tylenol) 650 mg PRN Q6HRS  PRN PO HEADACHE Last administered on 

12/17/17at 07:48;  Start 12/16/17 at 19:45


Pantoprazole Sodium (PROTONIX VIAL for IV PUSH) 40 mg DAILYAC IVP  Last 

administered on 12/18/17at 11:05;  Start 12/17/17 at 14:00


Vitals/I & O





Vital Sign - Last 24 Hours








 12/17/17 12/17/17 12/17/17 12/17/17





 14:45 19:00 19:25 23:00


 


Temp 98.2 97.9  97.5





 98.2 97.9  97.5


 


Pulse 55 57  60


 


Resp 18 20  20


 


B/P (MAP) 120/47 (71) 122/49 (73)  115/50 (71)


 


Pulse Ox 93 95  95


 


O2 Delivery Room Air Room Air Room Air Room Air


 


    





    





 12/18/17 12/18/17 12/18/17 12/18/17





 03:32 07:10 08:00 10:55


 


Temp 96.4 97.7  97.9





 96.4 97.7  97.9


 


Pulse 63 57  59


 


Resp 20 18  18


 


B/P (MAP) 113/43 (66) 130/49 (76)  131/69 (89)


 


Pulse Ox 95 96  95


 


O2 Delivery Room Air Room Air Room Air Room Air


 


    





    





 12/18/17   





 12:22   


 


Temp 99.0   





 99.0   


 


Pulse 57   


 


Resp 16   


 


B/P (MAP) 161/71   


 


Pulse Ox 94   


 


O2 Delivery Room Air   














Intake and Output   


 


 12/17/17 12/17/17 12/18/17





 14:59 22:59 06:59


 


Intake Total  200 ml 120 ml


 


Output Total 1 ml 2 ml 


 


Balance -1 ml 198 ml 120 ml

















LUCIE SHIELDS III DO Dec 18, 2017 12:42

## 2017-12-19 VITALS — DIASTOLIC BLOOD PRESSURE: 46 MMHG | SYSTOLIC BLOOD PRESSURE: 106 MMHG

## 2017-12-19 VITALS
SYSTOLIC BLOOD PRESSURE: 130 MMHG | SYSTOLIC BLOOD PRESSURE: 130 MMHG | DIASTOLIC BLOOD PRESSURE: 52 MMHG | DIASTOLIC BLOOD PRESSURE: 52 MMHG | DIASTOLIC BLOOD PRESSURE: 52 MMHG | SYSTOLIC BLOOD PRESSURE: 130 MMHG | DIASTOLIC BLOOD PRESSURE: 52 MMHG | SYSTOLIC BLOOD PRESSURE: 130 MMHG

## 2017-12-19 VITALS — DIASTOLIC BLOOD PRESSURE: 72 MMHG | SYSTOLIC BLOOD PRESSURE: 107 MMHG

## 2017-12-19 VITALS — SYSTOLIC BLOOD PRESSURE: 125 MMHG | DIASTOLIC BLOOD PRESSURE: 71 MMHG

## 2017-12-19 LAB
ANION GAP SERPL CALC-SCNC: 11 MMOL/L (ref 6–14)
BASOPHILS # BLD AUTO: 0 X10^3/UL (ref 0–0.2)
BASOPHILS NFR BLD: 0 % (ref 0–3)
BUN SERPL-MCNC: 11 MG/DL (ref 7–20)
CALCIUM SERPL-MCNC: 8.4 MG/DL (ref 8.5–10.1)
CHLORIDE SERPL-SCNC: 106 MMOL/L (ref 98–107)
CO2 SERPL-SCNC: 25 MMOL/L (ref 21–32)
CREAT SERPL-MCNC: 0.8 MG/DL (ref 0.6–1)
EOSINOPHIL NFR BLD: 0 % (ref 0–3)
ERYTHROCYTE [DISTWIDTH] IN BLOOD BY AUTOMATED COUNT: 13.4 % (ref 11.5–14.5)
GFR SERPLBLD BASED ON 1.73 SQ M-ARVRAT: 74.2 ML/MIN
GLUCOSE SERPL-MCNC: 102 MG/DL (ref 70–99)
HCT VFR BLD CALC: 38.9 % (ref 36–47)
HGB BLD-MCNC: 12.7 G/DL (ref 12–15.5)
LYMPHOCYTES # BLD: 1.5 X10^3/UL (ref 1–4.8)
LYMPHOCYTES NFR BLD AUTO: 18 % (ref 24–48)
MCH RBC QN AUTO: 31 PG (ref 25–35)
MCHC RBC AUTO-ENTMCNC: 33 G/DL (ref 31–37)
MCV RBC AUTO: 94 FL (ref 79–100)
MONOCYTES NFR BLD: 7 % (ref 0–9)
NEUTROPHILS NFR BLD AUTO: 76 % (ref 31–73)
PLATELET # BLD AUTO: 284 X10^3/UL (ref 140–400)
POTASSIUM SERPL-SCNC: 4 MMOL/L (ref 3.5–5.1)
RBC # BLD AUTO: 4.14 X10^6/UL (ref 3.5–5.4)
SODIUM SERPL-SCNC: 142 MMOL/L (ref 136–145)
WBC # BLD AUTO: 8.8 X10^3/UL (ref 4–11)

## 2017-12-19 RX ADMIN — DOCUSATE SODIUM SCH MG: 100 CAPSULE, LIQUID FILLED ORAL at 10:51

## 2017-12-19 RX ADMIN — PIPERACILLIN SODIUM AND TAZOBACTAM SODIUM SCH GM: 3; .375 INJECTION, POWDER, FOR SOLUTION INTRAVENOUS at 13:28

## 2017-12-19 RX ADMIN — SODIUM CHLORIDE, SODIUM LACTATE, POTASSIUM CHLORIDE, AND CALCIUM CHLORIDE SCH MLS/HR: .6; .31; .03; .02 INJECTION, SOLUTION INTRAVENOUS at 03:12

## 2017-12-19 RX ADMIN — BACITRACIN SCH MLS/HR: 5000 INJECTION, POWDER, FOR SOLUTION INTRAMUSCULAR at 14:10

## 2017-12-19 RX ADMIN — SODIUM CHLORIDE, SODIUM LACTATE, POTASSIUM CHLORIDE, AND CALCIUM CHLORIDE SCH MLS/HR: .6; .31; .03; .02 INJECTION, SOLUTION INTRAVENOUS at 11:30

## 2017-12-19 RX ADMIN — PANTOPRAZOLE SODIUM SCH MG: 40 INJECTION, POWDER, FOR SOLUTION INTRAVENOUS at 10:51

## 2017-12-19 RX ADMIN — BACITRACIN SCH MLS/HR: 5000 INJECTION, POWDER, FOR SOLUTION INTRAMUSCULAR at 05:15

## 2017-12-19 RX ADMIN — PIPERACILLIN SODIUM AND TAZOBACTAM SODIUM SCH GM: 3; .375 INJECTION, POWDER, FOR SOLUTION INTRAVENOUS at 05:50

## 2017-12-19 NOTE — PDOC
SURGICAL PROGRESS NOTE


Subjective


tolerating diet


pain managed


urinating


Vital Signs





Vital Signs








  Date Time  Temp Pulse Resp B/P (MAP) Pulse Ox O2 Delivery O2 Flow Rate FiO2


 


12/19/17 07:00 97.9 50 18 107/72 (84) 92 Room Air  





 97.9       


 


12/18/17 15:29       2 








I&O











Intake and Output 


 


 12/19/17





 07:00


 


Intake Total 2800 ml


 


Output Total 10 ml


 


Balance 2790 ml


 


 


 


Intake Oral 600 ml


 


IV Total 2200 ml


 


Estimated Blood Loss 10 ml


 


# Voids 7


 


# Bowel Movements 1








General:  Alert, Oriented X3, Cooperative, No acute distress


Abdomen:  Soft, Other (incisions dry)


Labs





Laboratory Tests








Test


  12/18/17


04:00 12/19/17


04:00


 


White Blood Count


  6.6 x10^3/uL


(4.0-11.0) 8.8 x10^3/uL


(4.0-11.0)


 


Red Blood Count


  4.13 x10^6/uL


(3.50-5.40) 4.14 x10^6/uL


(3.50-5.40)


 


Hemoglobin


  13.0 g/dL


(12.0-15.5) 12.7 g/dL


(12.0-15.5)


 


Hematocrit


  38.4 %


(36.0-47.0) 38.9 %


(36.0-47.0)


 


Mean Corpuscular Volume 93 fL ()  94 fL () 


 


Mean Corpuscular Hemoglobin 31 pg (25-35)  31 pg (25-35) 


 


Mean Corpuscular Hemoglobin


Concent 34 g/dL


(31-37) 33 g/dL


(31-37)


 


Red Cell Distribution Width


  13.5 %


(11.5-14.5) 13.4 %


(11.5-14.5)


 


Platelet Count


  284 x10^3/uL


(140-400) 284 x10^3/uL


(140-400)


 


Neutrophils (%) (Auto) 44 % (31-73)  76 % (31-73) 


 


Lymphocytes (%) (Auto) 41 % (24-48)  18 % (24-48) 


 


Monocytes (%) (Auto) 8 % (0-9)  7 % (0-9) 


 


Eosinophils (%) (Auto) 6 % (0-3)  0 % (0-3) 


 


Basophils (%) (Auto) 1 % (0-3)  0 % (0-3) 


 


Neutrophils # (Auto)


  2.9 x10^3uL


(1.8-7.7) 6.7 x10^3uL


(1.8-7.7)


 


Lymphocytes # (Auto)


  2.7 x10^3/uL


(1.0-4.8) 1.5 x10^3/uL


(1.0-4.8)


 


Monocytes # (Auto)


  0.5 x10^3/uL


(0.0-1.1) 0.6 x10^3/uL


(0.0-1.1)


 


Eosinophils # (Auto)


  0.4 x10^3/uL


(0.0-0.7) 0.0 x10^3/uL


(0.0-0.7)


 


Basophils # (Auto)


  0.1 x10^3/uL


(0.0-0.2) 0.0 x10^3/uL


(0.0-0.2)


 


Sodium Level


  143 mmol/L


(136-145) 142 mmol/L


(136-145)


 


Potassium Level


  3.6 mmol/L


(3.5-5.1) 4.0 mmol/L


(3.5-5.1)


 


Chloride Level


  110 mmol/L


() 106 mmol/L


()


 


Carbon Dioxide Level


  24 mmol/L


(21-32) 25 mmol/L


(21-32)


 


Anion Gap 9 (6-14)  11 (6-14) 


 


Blood Urea Nitrogen


  9 mg/dL (7-20) 


  11 mg/dL


(7-20)


 


Creatinine


  0.8 mg/dL


(0.6-1.0) 0.8 mg/dL


(0.6-1.0)


 


Estimated GFR


(Cockcroft-Gault) 74.2 


  74.2 


 


 


BUN/Creatinine Ratio 11 (6-20)  


 


Glucose Level


  102 mg/dL


(70-99) 102 mg/dL


(70-99)


 


Calcium Level


  8.5 mg/dL


(8.5-10.1) 8.4 mg/dL


(8.5-10.1)


 


Total Bilirubin


  1.2 mg/dL


(0.2-1.0) 


 


 


Aspartate Amino Transf


(AST/SGOT) 116 U/L


(15-37) 


 


 


Alanine Aminotransferase


(ALT/SGPT) 378 U/L


(14-59) 


 


 


Alkaline Phosphatase


  215 U/L


() 


 


 


Total Protein


  6.0 g/dL


(6.4-8.2) 


 


 


Albumin


  2.9 g/dL


(3.4-5.0) 


 


 


Albumin/Globulin Ratio 0.9 (1.0-1.7)  


 


Lipase


  380 U/L


() 


 








Laboratory Tests








Test


  12/19/17


04:00


 


White Blood Count


  8.8 x10^3/uL


(4.0-11.0)


 


Red Blood Count


  4.14 x10^6/uL


(3.50-5.40)


 


Hemoglobin


  12.7 g/dL


(12.0-15.5)


 


Hematocrit


  38.9 %


(36.0-47.0)


 


Mean Corpuscular Volume 94 fL () 


 


Mean Corpuscular Hemoglobin 31 pg (25-35) 


 


Mean Corpuscular Hemoglobin


Concent 33 g/dL


(31-37)


 


Red Cell Distribution Width


  13.4 %


(11.5-14.5)


 


Platelet Count


  284 x10^3/uL


(140-400)


 


Neutrophils (%) (Auto) 76 % (31-73) 


 


Lymphocytes (%) (Auto) 18 % (24-48) 


 


Monocytes (%) (Auto) 7 % (0-9) 


 


Eosinophils (%) (Auto) 0 % (0-3) 


 


Basophils (%) (Auto) 0 % (0-3) 


 


Neutrophils # (Auto)


  6.7 x10^3uL


(1.8-7.7)


 


Lymphocytes # (Auto)


  1.5 x10^3/uL


(1.0-4.8)


 


Monocytes # (Auto)


  0.6 x10^3/uL


(0.0-1.1)


 


Eosinophils # (Auto)


  0.0 x10^3/uL


(0.0-0.7)


 


Basophils # (Auto)


  0.0 x10^3/uL


(0.0-0.2)


 


Sodium Level


  142 mmol/L


(136-145)


 


Potassium Level


  4.0 mmol/L


(3.5-5.1)


 


Chloride Level


  106 mmol/L


()


 


Carbon Dioxide Level


  25 mmol/L


(21-32)


 


Anion Gap 11 (6-14) 


 


Blood Urea Nitrogen


  11 mg/dL


(7-20)


 


Creatinine


  0.8 mg/dL


(0.6-1.0)


 


Estimated GFR


(Cockcroft-Gault) 74.2 


 


 


Glucose Level


  102 mg/dL


(70-99)


 


Calcium Level


  8.4 mg/dL


(8.5-10.1)








Assessment/Plan


s/p justice


umbilical incision was previously draining--however dry now


Ok to DC home today


FU 2 weeks


Problems:  











JASPREET DAI Dec 19, 2017 09:23

## 2017-12-19 NOTE — PDOC
PROGRESS NOTES


Chief Complaint


Chief Complaint


Acute pancreatitis


Possible gallstone pancreatitis


Nausea and vomiting and diarrhea


Cholangitis


pod #1 doig well , wants to go home, no pain, does not want pain meds





History of Present Illness


History of Present Illness


VSS


 surgery YESTERDAY- [facility]


Family present


Discussed with nurse


Abdomen normal 


Liver enzymes improving,





Vitals


Vitals





Vital Signs








  Date Time  Temp Pulse Resp B/P (MAP) Pulse Ox O2 Delivery O2 Flow Rate FiO2


 


12/19/17 10:44 97.9 53 18 125/71 (89) 95 Room Air  





 97.9       


 


12/18/17 15:29       2 











Physical Exam


General:  Alert, Oriented X3, Cooperative, No acute distress


Heart:  Regular rate, Normal S1, Normal S2


Abdomen:  Soft, Other (incisions dry)


Extremities:  No clubbing, No cyanosis


Skin:  No rashes, No breakdown





Labs


LABS





Laboratory Tests








Test


  12/19/17


04:00


 


White Blood Count


  8.8 x10^3/uL


(4.0-11.0)


 


Red Blood Count


  4.14 x10^6/uL


(3.50-5.40)


 


Hemoglobin


  12.7 g/dL


(12.0-15.5)


 


Hematocrit


  38.9 %


(36.0-47.0)


 


Mean Corpuscular Volume 94 fL () 


 


Mean Corpuscular Hemoglobin 31 pg (25-35) 


 


Mean Corpuscular Hemoglobin


Concent 33 g/dL


(31-37)


 


Red Cell Distribution Width


  13.4 %


(11.5-14.5)


 


Platelet Count


  284 x10^3/uL


(140-400)


 


Neutrophils (%) (Auto) 76 % (31-73) 


 


Lymphocytes (%) (Auto) 18 % (24-48) 


 


Monocytes (%) (Auto) 7 % (0-9) 


 


Eosinophils (%) (Auto) 0 % (0-3) 


 


Basophils (%) (Auto) 0 % (0-3) 


 


Neutrophils # (Auto)


  6.7 x10^3uL


(1.8-7.7)


 


Lymphocytes # (Auto)


  1.5 x10^3/uL


(1.0-4.8)


 


Monocytes # (Auto)


  0.6 x10^3/uL


(0.0-1.1)


 


Eosinophils # (Auto)


  0.0 x10^3/uL


(0.0-0.7)


 


Basophils # (Auto)


  0.0 x10^3/uL


(0.0-0.2)


 


Sodium Level


  142 mmol/L


(136-145)


 


Potassium Level


  4.0 mmol/L


(3.5-5.1)


 


Chloride Level


  106 mmol/L


()


 


Carbon Dioxide Level


  25 mmol/L


(21-32)


 


Anion Gap 11 (6-14) 


 


Blood Urea Nitrogen


  11 mg/dL


(7-20)


 


Creatinine


  0.8 mg/dL


(0.6-1.0)


 


Estimated GFR


(Cockcroft-Gault) 74.2 


 


 


Glucose Level


  102 mg/dL


(70-99)


 


Calcium Level


  8.4 mg/dL


(8.5-10.1)











Comment


Review of Relevant


I have reviewed the following items marco (where applicable) has been applied.


Labs





Laboratory Tests








Test


  12/18/17


04:00 12/19/17


04:00


 


White Blood Count


  6.6 x10^3/uL


(4.0-11.0) 8.8 x10^3/uL


(4.0-11.0)


 


Red Blood Count


  4.13 x10^6/uL


(3.50-5.40) 4.14 x10^6/uL


(3.50-5.40)


 


Hemoglobin


  13.0 g/dL


(12.0-15.5) 12.7 g/dL


(12.0-15.5)


 


Hematocrit


  38.4 %


(36.0-47.0) 38.9 %


(36.0-47.0)


 


Mean Corpuscular Volume 93 fL ()  94 fL () 


 


Mean Corpuscular Hemoglobin 31 pg (25-35)  31 pg (25-35) 


 


Mean Corpuscular Hemoglobin


Concent 34 g/dL


(31-37) 33 g/dL


(31-37)


 


Red Cell Distribution Width


  13.5 %


(11.5-14.5) 13.4 %


(11.5-14.5)


 


Platelet Count


  284 x10^3/uL


(140-400) 284 x10^3/uL


(140-400)


 


Neutrophils (%) (Auto) 44 % (31-73)  76 % (31-73) 


 


Lymphocytes (%) (Auto) 41 % (24-48)  18 % (24-48) 


 


Monocytes (%) (Auto) 8 % (0-9)  7 % (0-9) 


 


Eosinophils (%) (Auto) 6 % (0-3)  0 % (0-3) 


 


Basophils (%) (Auto) 1 % (0-3)  0 % (0-3) 


 


Neutrophils # (Auto)


  2.9 x10^3uL


(1.8-7.7) 6.7 x10^3uL


(1.8-7.7)


 


Lymphocytes # (Auto)


  2.7 x10^3/uL


(1.0-4.8) 1.5 x10^3/uL


(1.0-4.8)


 


Monocytes # (Auto)


  0.5 x10^3/uL


(0.0-1.1) 0.6 x10^3/uL


(0.0-1.1)


 


Eosinophils # (Auto)


  0.4 x10^3/uL


(0.0-0.7) 0.0 x10^3/uL


(0.0-0.7)


 


Basophils # (Auto)


  0.1 x10^3/uL


(0.0-0.2) 0.0 x10^3/uL


(0.0-0.2)


 


Sodium Level


  143 mmol/L


(136-145) 142 mmol/L


(136-145)


 


Potassium Level


  3.6 mmol/L


(3.5-5.1) 4.0 mmol/L


(3.5-5.1)


 


Chloride Level


  110 mmol/L


() 106 mmol/L


()


 


Carbon Dioxide Level


  24 mmol/L


(21-32) 25 mmol/L


(21-32)


 


Anion Gap 9 (6-14)  11 (6-14) 


 


Blood Urea Nitrogen


  9 mg/dL (7-20) 


  11 mg/dL


(7-20)


 


Creatinine


  0.8 mg/dL


(0.6-1.0) 0.8 mg/dL


(0.6-1.0)


 


Estimated GFR


(Cockcroft-Gault) 74.2 


  74.2 


 


 


BUN/Creatinine Ratio 11 (6-20)  


 


Glucose Level


  102 mg/dL


(70-99) 102 mg/dL


(70-99)


 


Calcium Level


  8.5 mg/dL


(8.5-10.1) 8.4 mg/dL


(8.5-10.1)


 


Total Bilirubin


  1.2 mg/dL


(0.2-1.0) 


 


 


Aspartate Amino Transf


(AST/SGOT) 116 U/L


(15-37) 


 


 


Alanine Aminotransferase


(ALT/SGPT) 378 U/L


(14-59) 


 


 


Alkaline Phosphatase


  215 U/L


() 


 


 


Total Protein


  6.0 g/dL


(6.4-8.2) 


 


 


Albumin


  2.9 g/dL


(3.4-5.0) 


 


 


Albumin/Globulin Ratio 0.9 (1.0-1.7)  


 


Lipase


  380 U/L


() 


 








Laboratory Tests








Test


  12/19/17


04:00


 


White Blood Count


  8.8 x10^3/uL


(4.0-11.0)


 


Red Blood Count


  4.14 x10^6/uL


(3.50-5.40)


 


Hemoglobin


  12.7 g/dL


(12.0-15.5)


 


Hematocrit


  38.9 %


(36.0-47.0)


 


Mean Corpuscular Volume 94 fL () 


 


Mean Corpuscular Hemoglobin 31 pg (25-35) 


 


Mean Corpuscular Hemoglobin


Concent 33 g/dL


(31-37)


 


Red Cell Distribution Width


  13.4 %


(11.5-14.5)


 


Platelet Count


  284 x10^3/uL


(140-400)


 


Neutrophils (%) (Auto) 76 % (31-73) 


 


Lymphocytes (%) (Auto) 18 % (24-48) 


 


Monocytes (%) (Auto) 7 % (0-9) 


 


Eosinophils (%) (Auto) 0 % (0-3) 


 


Basophils (%) (Auto) 0 % (0-3) 


 


Neutrophils # (Auto)


  6.7 x10^3uL


(1.8-7.7)


 


Lymphocytes # (Auto)


  1.5 x10^3/uL


(1.0-4.8)


 


Monocytes # (Auto)


  0.6 x10^3/uL


(0.0-1.1)


 


Eosinophils # (Auto)


  0.0 x10^3/uL


(0.0-0.7)


 


Basophils # (Auto)


  0.0 x10^3/uL


(0.0-0.2)


 


Sodium Level


  142 mmol/L


(136-145)


 


Potassium Level


  4.0 mmol/L


(3.5-5.1)


 


Chloride Level


  106 mmol/L


()


 


Carbon Dioxide Level


  25 mmol/L


(21-32)


 


Anion Gap 11 (6-14) 


 


Blood Urea Nitrogen


  11 mg/dL


(7-20)


 


Creatinine


  0.8 mg/dL


(0.6-1.0)


 


Estimated GFR


(Cockcroft-Gault) 74.2 


 


 


Glucose Level


  102 mg/dL


(70-99)


 


Calcium Level


  8.4 mg/dL


(8.5-10.1)








Medications





Current Medications


Enoxaparin Sodium (Lovenox Per Pharmacy Prophylaxis Dosing) 1 each PRN DAILY  

PRN MC SEE COMMENTS;  Start 12/16/17 at 15:45


Oxycodone HCl (Roxicodone) 5 mg PRN Q6HRS  PRN PO PAIN SEVERE;  Start 12/16/17 

at 15:45


Fentanyl Citrate (Fentanyl 2ml Vial) 50 mcg PRN Q2HR  PRN IV PAIN SEVERE;  

Start 12/16/17 at 15:45


Piperacillin Sod/ Tazobactam Sod (Zosyn Per Pharmacy) 1 each PRN DAILY  PRN MC 

SEE COMMENTS;  Start 12/16/17 at 15:45


Enoxaparin Sodium (Lovenox 40mg Syringe) 40 mg Q24H SQ  Last administered on 12/ 18/17at 20:36;  Start 12/16/17 at 21:00


Piperacillin Sod/ Tazobactam Sod (Zosyn) 3.375 gm Q6HRS IVP  Last administered 

on 12/19/17at 13:28;  Start 12/16/17 at 18:00


Sodium Chloride 1,000 ml @  100 mls/hr Q10H IV  Last administered on 12/18/17at 

15:11;  Start 12/16/17 at 17:15


Saliva Substitute (Biotene Moisturizing Mouth) 2 spray PRN Q15MIN  PRN PO DRY 

MOUTH;  Start 12/16/17 at 17:45


Ondansetron HCl (Zofran) 4 mg PRN Q8HRS  PRN IV NAUSEA/VOMITING 1ST CHOICE;  

Start 12/16/17 at 17:45


Acetaminophen (Tylenol) 650 mg PRN Q6HRS  PRN PO HEADACHE Last administered on 

12/17/17at 07:48;  Start 12/16/17 at 19:45


Pantoprazole Sodium (PROTONIX VIAL for IV PUSH) 40 mg DAILYAC IVP  Last 

administered on 12/19/17at 10:51;  Start 12/17/17 at 14:00


Propofol 20 ml @ As Directed STK-MED ONCE IV ;  Start 12/18/17 at 12:57;  Stop 

12/18/17 at 12:58;  Status DC


Dexamethasone Sodium Phosphate (Decadron) 20 mg STK-MED ONCE .ROUTE ;  Start 12/ 18/17 at 12:57;  Stop 12/18/17 at 12:58;  Status DC


Famotidine (Pepcid Vial) 20 mg STK-MED ONCE .ROUTE ;  Start 12/18/17 at 12:57;  

Stop 12/18/17 at 12:58;  Status DC


Ondansetron HCl (Zofran) 4 mg STK-MED ONCE .ROUTE ;  Start 12/18/17 at 12:57;  

Stop 12/18/17 at 12:58;  Status DC


Rocuronium Bromide (Zemuron) 50 mg STK-MED ONCE .ROUTE ;  Start 12/18/17 at 12:

58;  Stop 12/18/17 at 12:59;  Status DC


Fentanyl Citrate (Fentanyl 2ml Vial) 100 mcg STK-MED ONCE .ROUTE ;  Start 12/18/ 17 at 12:58;  Stop 12/18/17 at 12:59;  Status DC


Midazolam HCl (Versed) 2 mg STK-MED ONCE .ROUTE ;  Start 12/18/17 at 12:58;  

Stop 12/18/17 at 12:59;  Status DC


Iohexol (Omnipaque 300 Mg/ml) 50 ml STK-MED ONCE .ROUTE  Last administered on 12 /18/17at 15:23;  Start 12/18/17 at 13:00;  Stop 12/18/17 at 13:01;  Status DC


Bupivacaine HCl/ Epinephrine Bitart (Sensorcain-Mpf Epi 0.5%-1:[id number]) 30 ml STK

-MED ONCE .ROUTE  Last administered on 12/18/17at 15:23;  Start 12/18/17 at 13:

00;  Stop 12/18/17 at 13:01;  Status DC


Lidocaine HCl (Lidocaine Pf 2% Vial) 5 ml STK-MED ONCE .ROUTE ;  Start 12/18/17 

at 13:00;  Stop 12/18/17 at 13:01;  Status DC


Cellulose 1 each STK-MED ONCE .ROUTE ;  Start 12/18/17 at 13:00;  Stop 12/18/17 

at 13:01;  Status DC


Heparin Sodium (Porcine) (Heparin Sodium) 10,000 unit STK-MED ONCE .ROUTE  Last 

administered on 12/18/17at 15:24;  Start 12/18/17 at 13:00;  Stop 12/18/17 at 13

:01;  Status DC


Bisacodyl (Dulcolax Supp) 10 mg STK-MED ONCE .ROUTE ;  Start 12/18/17 at 13:00;

  Stop 12/18/17 at 13:01;  Status DC


Diphenhydramine HCl (Benadryl) 50 mg STK-MED ONCE .ROUTE ;  Start 12/18/17 at 13

:33;  Stop 12/18/17 at 13:34;  Status DC


Ketorolac Tromethamine (Toradol For Or Only) 30 mg STK-MED ONCE INJ ;  Start 12/ 18/17 at 13:33;  Stop 12/18/17 at 13:34;  Status DC


Neostigmine Methylsulfate 5 mg STK-MED ONCE .ROUTE ;  Start 12/18/17 at 13:33;  

Stop 12/18/17 at 13:34;  Status DC


Glycopyrrolate (Robinul) 1 mg STK-MED ONCE .ROUTE ;  Start 12/18/17 at 13:34;  

Stop 12/18/17 at 13:35;  Status DC


Piperacillin Sod/ Tazobactam Sod (Zosyn) 3.375 gm 1X  ONCE IVP ;  Start 12/18/ 17 at 13:45;  Stop 12/18/17 at 13:46;  Status DC


Sevoflurane (Ultane) 60 ml STK-MED ONCE IH ;  Start 12/18/17 at 14:17;  Stop 12/ 18/17 at 14:18;  Status DC


Ondansetron HCl (Zofran) 4 mg PRN Q6HRS  PRN IV NAUSEA/VOMITING;  Start 12/18/ 17 at 14:45;  Stop 12/18/17 at 20:00;  Status DC


Fentanyl Citrate (Fentanyl 2ml Vial) 25 mcg PRN Q5MIN  PRN IV MILD PAIN;  Start 

12/18/17 at 14:45;  Stop 12/18/17 at 20:00;  Status DC


Fentanyl Citrate (Fentanyl 2ml Vial) 50 mcg PRN Q5MIN  PRN IV MODERATE PAIN;  

Start 12/18/17 at 14:45;  Stop 12/18/17 at 20:00;  Status DC


Morphine Sulfate 1 mg PRN Q10MIN  PRN IV SEVERE PAIN;  Start 12/18/17 at 14:45;

  Stop 12/18/17 at 20:00;  Status DC


Ringer's Solution 1,000 ml @  30 mls/hr Q24H IV ;  Start 12/18/17 at 14:31;  

Stop 12/19/17 at 02:30;  Status DC


Lidocaine HCl (Xylocaine-Mpf 1% Vial) 2 ml 1X PRN  PRN ID IV START;  Start 12/18 /17 at 14:45;  Stop 12/18/17 at 20:00;  Status DC


Hydromorphone HCl (Dilaudid) 0.5 mg PRN Q10MIN  PRN IV SEV PAIN, Second choice;

  Start 12/18/17 at 14:45;  Stop 12/18/17 at 20:00;  Status DC


Prochlorperazine Edisylate (Compazine) 5 mg PACU PRN  PRN IV NAUSEA, MRX1;  

Start 12/18/17 at 14:45;  Stop 12/18/17 at 20:00;  Status DC


Sodium Chloride (Normal Saline Flush) 3 ml QSHIFT  PRN IV AFTER MEDS AND BLOOD 

DRAWS;  Start 12/18/17 at 15:15


Ringer's Solution 1,000 ml @  100 mls/hr Q10H IV  Last administered on 12/19/ 17at 11:30;  Start 12/18/17 at 15:30


Dextrose (Dextrose 50%-Water Syringe) 12.5 gm PRN Q15MIN  PRN IV SEE COMMENTS;  

Start 12/18/17 at 15:15


Acetaminophen/ Hydrocodone Bitart (Lortab 5/325) 1 tab PRN Q4HRS  PRN PO MILD 

PAIN Last administered on 12/18/17at 23:52;  Start 12/18/17 at 15:15


Docusate Sodium (Colace) 100 mg BID PO  Last administered on 12/19/17at 10:51;  

Start 12/18/17 at 21:00


Ondansetron HCl (Zofran) 4 mg PRN Q6HRS  PRN IV NAUESA, 1ST CHOICE;  Start 12/18 /17 at 15:15


Vitals/I & O





Vital Sign - Last 24 Hours








 12/18/17 12/18/17 12/18/17 12/18/17





 14:59 15:14 15:29 15:55


 


Temp  97.5 97.5 96.3





  97.5 97.5 96.3


 


Pulse 63 62 55 61


 


Resp 20 20 20 18


 


B/P (MAP) 143/58 139/59 134/55 134/69 (90)


 


Pulse Ox 100 98 95 95


 


O2 Delivery Simple Mask Room Air Nasal Cannula Room Air


 


O2 Flow Rate 10  2 


 


    





    





 12/18/17 12/18/17 12/18/17 12/18/17





 16:10 16:25 16:40 16:55


 


Pulse 62 60 65 62


 


B/P (MAP) 145/77 (99) 138/52 (80) 134/57 (82) 127/55 (79)





 12/18/17 12/18/17 12/18/17 12/18/17





 17:25 17:55 18:17 19:00


 


Temp    98.2





    98.2


 


Pulse 74 62  79


 


Resp   20 18


 


B/P (MAP) 128/89 (102) 144/67 (92)  147/59 (88)


 


Pulse Ox   95 94


 


O2 Delivery   Nasal Cannula Room Air


 


    





    





 12/18/17 12/18/17 12/19/17 12/19/17





 20:00 23:00 03:00 07:00


 


Temp  98.1 98.1 97.9





  98.1 98.1 97.9


 


Pulse  58 46 50


 


Resp  18 18 18


 


B/P (MAP)  113/53 (73) 106/46 (66) 107/72 (84)


 


Pulse Ox  91 96 92


 


O2 Delivery Room Air Room Air Room Air Room Air


 


    





    





 12/19/17 12/19/17  





 08:00 10:44  


 


Temp  97.9  





  97.9  


 


Pulse  53  


 


Resp  18  


 


B/P (MAP)  125/71 (89)  


 


Pulse Ox  95  


 


O2 Delivery Room Air Room Air  














Intake and Output   


 


 12/18/17 12/18/17 12/19/17





 15:00 23:00 07:00


 


Intake Total 0 ml 1600 ml 1200 ml


 


Output Total 10 ml  


 


Balance -10 ml 1600 ml 1200 ml

















PRO STEVENSON MD Dec 19, 2017 14:53

## 2017-12-19 NOTE — PDOC
Subjective:


Subjective:


Some RUQ discomfort.  Says incision bled earlier.


Tolerating PO, not much appetite.  Passing gas.





Objective:


Vital Signs:





 Vital Signs








  Date Time  Temp Pulse Resp B/P (MAP) Pulse Ox O2 Delivery O2 Flow Rate FiO2


 


12/19/17 07:00 97.9 50 18 107/72 (84) 92 Room Air  





 97.9       


 


12/18/17 15:29       2 








Labs:





Laboratory Tests








Test


  12/19/17


04:00


 


White Blood Count 8.8 x10^3/uL 


 


Red Blood Count 4.14 x10^6/uL 


 


Hemoglobin 12.7 g/dL 


 


Hematocrit 38.9 % 


 


Mean Corpuscular Volume 94 fL 


 


Mean Corpuscular Hemoglobin 31 pg 


 


Mean Corpuscular Hemoglobin


Concent 33 g/dL 


 


 


Red Cell Distribution Width 13.4 % 


 


Platelet Count 284 x10^3/uL 


 


Neutrophils (%) (Auto) 76 % 


 


Lymphocytes (%) (Auto) 18 % 


 


Monocytes (%) (Auto) 7 % 


 


Eosinophils (%) (Auto) 0 % 


 


Basophils (%) (Auto) 0 % 


 


Neutrophils # (Auto) 6.7 x10^3uL 


 


Lymphocytes # (Auto) 1.5 x10^3/uL 


 


Monocytes # (Auto) 0.6 x10^3/uL 


 


Eosinophils # (Auto) 0.0 x10^3/uL 


 


Basophils # (Auto) 0.0 x10^3/uL 


 


Sodium Level 142 mmol/L 


 


Potassium Level 4.0 mmol/L 


 


Chloride Level 106 mmol/L 


 


Carbon Dioxide Level 25 mmol/L 


 


Anion Gap 11 


 


Blood Urea Nitrogen 11 mg/dL 


 


Creatinine 0.8 mg/dL 


 


Estimated GFR


(Cockcroft-Gault) 74.2 


 


 


Glucose Level 102 mg/dL 


 


Calcium Level 8.4 mg/dL 








Imaging:


IOC 12/18/17


Impression: Negative study.





PE:





GEN: NAD


LUNGS: CTAB


HEART: RRR


ABD: soft, dried blood on bandage


NEURO/PSYCH: A & O 3





A/P:


S/p cholecystectomy, normal IOC


Elevated lipase (resolved), elevated LFTs (improved 12/18)





--


Continue per surgery.











GUSTAVO CAEBLLO Dec 19, 2017 09:50

## 2017-12-19 NOTE — PDOC3
Discharge Summary


Date of Admission:  Dec 16, 2017


Date of Discharge:  Dec 19, 2017


Follow-Up:  3-5 days


Admitting Diagnosis comment:


cholecystitis











DISCHARGE DIAGNOSIS=================================


Acute pancreatitis


 gallstone pancreatitis


Nausea and vomiting and diarrhea


Cholangitis


pod #1 doig well , wants to go home, no pain, does not want pain meds





History of Present Illness


History of Present Illness


VSS


 surgery YESTERDAY- lap justice


Family present





Abdomen normal 


Liver enzymes improving,





Vitals


Vitals





Vital Signs








  Date Time  Temp Pulse Resp B/P (MAP) Pulse Ox O2 Delivery O2 Flow Rate FiO2


 


12/19/17 10:44 97.9 53 18 125/71 (89) 95 Room Air  





 97.9       


 


12/18/17 15:29       2 











Physical Exam


General:  Alert, Oriented X3, Cooperative, No acute distress


Heart:  Regular rate, Normal S1, Normal S2


Abdomen:  Soft, Other (incisions dry)


Extremities:  No clubbing, No cyanosis


Skin:  No rashes, No breakdown


Problems:  


Brief Hospital Course


Ms. Thomas  is a 57 old [sex] who presented with [ ]


CONDITION AT DISCHARGE:  Stable


Discharge Medications





Current Medications


Enoxaparin Sodium (Lovenox Per Pharmacy Prophylaxis Dosing) 1 each PRN DAILY  

PRN MC SEE COMMENTS;  Start 12/16/17 at 15:45


Oxycodone HCl (Roxicodone) 5 mg PRN Q6HRS  PRN PO PAIN SEVERE;  Start 12/16/17 

at 15:45


Fentanyl Citrate (Fentanyl 2ml Vial) 50 mcg PRN Q2HR  PRN IV PAIN SEVERE;  

Start 12/16/17 at 15:45


Piperacillin Sod/ Tazobactam Sod (Zosyn Per Pharmacy) 1 each PRN DAILY  PRN MC 

SEE COMMENTS;  Start 12/16/17 at 15:45


Enoxaparin Sodium (Lovenox 40mg Syringe) 40 mg Q24H SQ  Last administered on 12/ 18/17at 20:36;  Start 12/16/17 at 21:00


Piperacillin Sod/ Tazobactam Sod (Zosyn) 3.375 gm Q6HRS IVP  Last administered 

on 12/19/17at 13:28;  Start 12/16/17 at 18:00


Sodium Chloride 1,000 ml @  100 mls/hr Q10H IV  Last administered on 12/18/17at 

15:11;  Start 12/16/17 at 17:15


Saliva Substitute (Biotene Moisturizing Mouth) 2 spray PRN Q15MIN  PRN PO DRY 

MOUTH;  Start 12/16/17 at 17:45


Ondansetron HCl (Zofran) 4 mg PRN Q8HRS  PRN IV NAUSEA/VOMITING 1ST CHOICE;  

Start 12/16/17 at 17:45


Acetaminophen (Tylenol) 650 mg PRN Q6HRS  PRN PO HEADACHE Last administered on 

12/17/17at 07:48;  Start 12/16/17 at 19:45


Pantoprazole Sodium (PROTONIX VIAL for IV PUSH) 40 mg DAILYAC IVP  Last 

administered on 12/19/17at 10:51;  Start 12/17/17 at 14:00


Propofol 20 ml @ As Directed STK-MED ONCE IV ;  Start 12/18/17 at 12:57;  Stop 

12/18/17 at 12:58;  Status DC


Dexamethasone Sodium Phosphate (Decadron) 20 mg STK-MED ONCE .ROUTE ;  Start 12/ 18/17 at 12:57;  Stop 12/18/17 at 12:58;  Status DC


Famotidine (Pepcid Vial) 20 mg STK-MED ONCE .ROUTE ;  Start 12/18/17 at 12:57;  

Stop 12/18/17 at 12:58;  Status DC


Ondansetron HCl (Zofran) 4 mg STK-MED ONCE .ROUTE ;  Start 12/18/17 at 12:57;  

Stop 12/18/17 at 12:58;  Status DC


Rocuronium Bromide (Zemuron) 50 mg STK-MED ONCE .ROUTE ;  Start 12/18/17 at 12:

58;  Stop 12/18/17 at 12:59;  Status DC


Fentanyl Citrate (Fentanyl 2ml Vial) 100 mcg STK-MED ONCE .ROUTE ;  Start 12/18/ 17 at 12:58;  Stop 12/18/17 at 12:59;  Status DC


Midazolam HCl (Versed) 2 mg STK-MED ONCE .ROUTE ;  Start 12/18/17 at 12:58;  

Stop 12/18/17 at 12:59;  Status DC


Iohexol (Omnipaque 300 Mg/ml) 50 ml STK-MED ONCE .ROUTE  Last administered on 12 /18/17at 15:23;  Start 12/18/17 at 13:00;  Stop 12/18/17 at 13:01;  Status DC


Bupivacaine HCl/ Epinephrine Bitart (Sensorcain-Mpf Epi 0.5%-1:186316) 30 ml STK

-MED ONCE .ROUTE  Last administered on 12/18/17at 15:23;  Start 12/18/17 at 13:

00;  Stop 12/18/17 at 13:01;  Status DC


Lidocaine HCl (Lidocaine Pf 2% Vial) 5 ml STK-MED ONCE .ROUTE ;  Start 12/18/17 

at 13:00;  Stop 12/18/17 at 13:01;  Status DC


Cellulose 1 each STK-MED ONCE .ROUTE ;  Start 12/18/17 at 13:00;  Stop 12/18/17 

at 13:01;  Status DC


Heparin Sodium (Porcine) (Heparin Sodium) 10,000 unit STK-MED ONCE .ROUTE  Last 

administered on 12/18/17at 15:24;  Start 12/18/17 at 13:00;  Stop 12/18/17 at 13

:01;  Status DC


Bisacodyl (Dulcolax Supp) 10 mg STK-MED ONCE .ROUTE ;  Start 12/18/17 at 13:00;

  Stop 12/18/17 at 13:01;  Status DC


Diphenhydramine HCl (Benadryl) 50 mg STK-MED ONCE .ROUTE ;  Start 12/18/17 at 13

:33;  Stop 12/18/17 at 13:34;  Status DC


Ketorolac Tromethamine (Toradol For Or Only) 30 mg STK-MED ONCE INJ ;  Start 12/ 18/17 at 13:33;  Stop 12/18/17 at 13:34;  Status DC


Neostigmine Methylsulfate 5 mg STK-MED ONCE .ROUTE ;  Start 12/18/17 at 13:33;  

Stop 12/18/17 at 13:34;  Status DC


Glycopyrrolate (Robinul) 1 mg STK-MED ONCE .ROUTE ;  Start 12/18/17 at 13:34;  

Stop 12/18/17 at 13:35;  Status DC


Piperacillin Sod/ Tazobactam Sod (Zosyn) 3.375 gm 1X  ONCE IVP ;  Start 12/18/ 17 at 13:45;  Stop 12/18/17 at 13:46;  Status DC


Sevoflurane (Ultane) 60 ml STK-MED ONCE IH ;  Start 12/18/17 at 14:17;  Stop 12/ 18/17 at 14:18;  Status DC


Ondansetron HCl (Zofran) 4 mg PRN Q6HRS  PRN IV NAUSEA/VOMITING;  Start 12/18/ 17 at 14:45;  Stop 12/18/17 at 20:00;  Status DC


Fentanyl Citrate (Fentanyl 2ml Vial) 25 mcg PRN Q5MIN  PRN IV MILD PAIN;  Start 

12/18/17 at 14:45;  Stop 12/18/17 at 20:00;  Status DC


Fentanyl Citrate (Fentanyl 2ml Vial) 50 mcg PRN Q5MIN  PRN IV MODERATE PAIN;  

Start 12/18/17 at 14:45;  Stop 12/18/17 at 20:00;  Status DC


Morphine Sulfate 1 mg PRN Q10MIN  PRN IV SEVERE PAIN;  Start 12/18/17 at 14:45;

  Stop 12/18/17 at 20:00;  Status DC


Ringer's Solution 1,000 ml @  30 mls/hr Q24H IV ;  Start 12/18/17 at 14:31;  

Stop 12/19/17 at 02:30;  Status DC


Lidocaine HCl (Xylocaine-Mpf 1% Vial) 2 ml 1X PRN  PRN ID IV START;  Start 12/18 /17 at 14:45;  Stop 12/18/17 at 20:00;  Status DC


Hydromorphone HCl (Dilaudid) 0.5 mg PRN Q10MIN  PRN IV SEV PAIN, Second choice;

  Start 12/18/17 at 14:45;  Stop 12/18/17 at 20:00;  Status DC


Prochlorperazine Edisylate (Compazine) 5 mg PACU PRN  PRN IV NAUSEA, MRX1;  

Start 12/18/17 at 14:45;  Stop 12/18/17 at 20:00;  Status DC


Sodium Chloride (Normal Saline Flush) 3 ml QSHIFT  PRN IV AFTER MEDS AND BLOOD 

DRAWS;  Start 12/18/17 at 15:15


Ringer's Solution 1,000 ml @  100 mls/hr Q10H IV  Last administered on 12/19/ 17at 11:30;  Start 12/18/17 at 15:30


Dextrose (Dextrose 50%-Water Syringe) 12.5 gm PRN Q15MIN  PRN IV SEE COMMENTS;  

Start 12/18/17 at 15:15


Acetaminophen/ Hydrocodone Bitart (Lortab 5/325) 1 tab PRN Q4HRS  PRN PO MILD 

PAIN Last administered on 12/18/17at 23:52;  Start 12/18/17 at 15:15


Docusate Sodium (Colace) 100 mg BID PO  Last administered on 12/19/17at 10:51;  

Start 12/18/17 at 21:00


Ondansetron HCl (Zofran) 4 mg PRN Q6HRS  PRN IV NAUESA, 1ST CHOICE;  Start 12/18 /17 at 15:15


Vital Signs





Vital Signs








  Date Time  Temp Pulse Resp B/P (MAP) Pulse Ox O2 Delivery O2 Flow Rate FiO2


 


12/19/17 10:44 97.9 53 18 125/71 (89) 95 Room Air  





 97.9       


 


12/18/17 15:29       2 








Labs





Laboratory Tests








Test


  12/18/17


04:00 12/19/17


04:00


 


White Blood Count


  6.6 x10^3/uL


(4.0-11.0) 8.8 x10^3/uL


(4.0-11.0)


 


Red Blood Count


  4.13 x10^6/uL


(3.50-5.40) 4.14 x10^6/uL


(3.50-5.40)


 


Hemoglobin


  13.0 g/dL


(12.0-15.5) 12.7 g/dL


(12.0-15.5)


 


Hematocrit


  38.4 %


(36.0-47.0) 38.9 %


(36.0-47.0)


 


Mean Corpuscular Volume 93 fL ()  94 fL () 


 


Mean Corpuscular Hemoglobin 31 pg (25-35)  31 pg (25-35) 


 


Mean Corpuscular Hemoglobin


Concent 34 g/dL


(31-37) 33 g/dL


(31-37)


 


Red Cell Distribution Width


  13.5 %


(11.5-14.5) 13.4 %


(11.5-14.5)


 


Platelet Count


  284 x10^3/uL


(140-400) 284 x10^3/uL


(140-400)


 


Neutrophils (%) (Auto) 44 % (31-73)  76 % (31-73) 


 


Lymphocytes (%) (Auto) 41 % (24-48)  18 % (24-48) 


 


Monocytes (%) (Auto) 8 % (0-9)  7 % (0-9) 


 


Eosinophils (%) (Auto) 6 % (0-3)  0 % (0-3) 


 


Basophils (%) (Auto) 1 % (0-3)  0 % (0-3) 


 


Neutrophils # (Auto)


  2.9 x10^3uL


(1.8-7.7) 6.7 x10^3uL


(1.8-7.7)


 


Lymphocytes # (Auto)


  2.7 x10^3/uL


(1.0-4.8) 1.5 x10^3/uL


(1.0-4.8)


 


Monocytes # (Auto)


  0.5 x10^3/uL


(0.0-1.1) 0.6 x10^3/uL


(0.0-1.1)


 


Eosinophils # (Auto)


  0.4 x10^3/uL


(0.0-0.7) 0.0 x10^3/uL


(0.0-0.7)


 


Basophils # (Auto)


  0.1 x10^3/uL


(0.0-0.2) 0.0 x10^3/uL


(0.0-0.2)


 


Sodium Level


  143 mmol/L


(136-145) 142 mmol/L


(136-145)


 


Potassium Level


  3.6 mmol/L


(3.5-5.1) 4.0 mmol/L


(3.5-5.1)


 


Chloride Level


  110 mmol/L


() 106 mmol/L


()


 


Carbon Dioxide Level


  24 mmol/L


(21-32) 25 mmol/L


(21-32)


 


Anion Gap 9 (6-14)  11 (6-14) 


 


Blood Urea Nitrogen


  9 mg/dL (7-20) 


  11 mg/dL


(7-20)


 


Creatinine


  0.8 mg/dL


(0.6-1.0) 0.8 mg/dL


(0.6-1.0)


 


Estimated GFR


(Cockcroft-Gault) 74.2 


  74.2 


 


 


BUN/Creatinine Ratio 11 (6-20)  


 


Glucose Level


  102 mg/dL


(70-99) 102 mg/dL


(70-99)


 


Calcium Level


  8.5 mg/dL


(8.5-10.1) 8.4 mg/dL


(8.5-10.1)


 


Total Bilirubin


  1.2 mg/dL


(0.2-1.0) 


 


 


Aspartate Amino Transf


(AST/SGOT) 116 U/L


(15-37) 


 


 


Alanine Aminotransferase


(ALT/SGPT) 378 U/L


(14-59) 


 


 


Alkaline Phosphatase


  215 U/L


() 


 


 


Total Protein


  6.0 g/dL


(6.4-8.2) 


 


 


Albumin


  2.9 g/dL


(3.4-5.0) 


 


 


Albumin/Globulin Ratio 0.9 (1.0-1.7)  


 


Lipase


  380 U/L


() 


 








Laboratory Tests








Test


  12/19/17


04:00


 


White Blood Count


  8.8 x10^3/uL


(4.0-11.0)


 


Red Blood Count


  4.14 x10^6/uL


(3.50-5.40)


 


Hemoglobin


  12.7 g/dL


(12.0-15.5)


 


Hematocrit


  38.9 %


(36.0-47.0)


 


Mean Corpuscular Volume 94 fL () 


 


Mean Corpuscular Hemoglobin 31 pg (25-35) 


 


Mean Corpuscular Hemoglobin


Concent 33 g/dL


(31-37)


 


Red Cell Distribution Width


  13.4 %


(11.5-14.5)


 


Platelet Count


  284 x10^3/uL


(140-400)


 


Neutrophils (%) (Auto) 76 % (31-73) 


 


Lymphocytes (%) (Auto) 18 % (24-48) 


 


Monocytes (%) (Auto) 7 % (0-9) 


 


Eosinophils (%) (Auto) 0 % (0-3) 


 


Basophils (%) (Auto) 0 % (0-3) 


 


Neutrophils # (Auto)


  6.7 x10^3uL


(1.8-7.7)


 


Lymphocytes # (Auto)


  1.5 x10^3/uL


(1.0-4.8)


 


Monocytes # (Auto)


  0.6 x10^3/uL


(0.0-1.1)


 


Eosinophils # (Auto)


  0.0 x10^3/uL


(0.0-0.7)


 


Basophils # (Auto)


  0.0 x10^3/uL


(0.0-0.2)


 


Sodium Level


  142 mmol/L


(136-145)


 


Potassium Level


  4.0 mmol/L


(3.5-5.1)


 


Chloride Level


  106 mmol/L


()


 


Carbon Dioxide Level


  25 mmol/L


(21-32)


 


Anion Gap 11 (6-14) 


 


Blood Urea Nitrogen


  11 mg/dL


(7-20)


 


Creatinine


  0.8 mg/dL


(0.6-1.0)


 


Estimated GFR


(Cockcroft-Gault) 74.2 


 


 


Glucose Level


  102 mg/dL


(70-99)


 


Calcium Level


  8.4 mg/dL


(8.5-10.1)








Allergies





 Allergies








Coded Allergies Type Severity Reaction Last Updated Verified


 


  No Known Drug Allergies    12/18/17 No








Disposition/Orders:  D/C to Home


Patient Instructions


post op lap justice routine inst per surgery











RPO STEVENSON MD Dec 19, 2017 14:59

## 2017-12-21 NOTE — PATHOLOGY
PATHOLOGY REPORT 

 

*    *    *    *    *    *    *    * 

 FINAL DIAGNOSIS:

Gallbladder, laparoscopic cholecystectomy:

- Cholelithiasis.

- Chronic cholecystitis.  

 

COMMENT:

There is no evidence of malignancy.

(JPM:mml; 2017) 

 

 

REPORT ELECTRONICALLY SIGNED BY:   Ashok Lyn M.D.

DATE/TIME:   2017 11:12

*    *    *    *    *    *    *    *

 

GROSS PATHOLOGY:

Received in formalin labeled "Michael Shook, gallbladder and

contents," is a 8.5 x 3.4 x 2.9 cm, intact gallbladder with light

grey to green, highly vascular serosal surfaces.  Opening the

gallbladder reveals dark green, velvety mucosa and an average wall

thickness of 0.2 cm.  Calculi are present, measuring 0.4 cm in

maximum dimension, possessing a green color and granular appearance,

and feeling friable to the touch.  No masses are noted grossly. 

Representative sections from the body and fundus are submitted along

with the proximal margin in cassette A1.

(TSD; 2017)

 

 

 INITIAL CPT CODE(S):

A; 70311

Professional services performed [place] [facility] [place] 

[place] [address]

 

  Technical services performed by [facility] at [address] [place] [address]

[address] [address].

  

 SPECIMEN(S) RECEIVED:

A.Gallbladder and contents

 

 CLINICAL HISTORY:

Cholecystitis, pancreatitis 

 

 PATIENT:  MICHAEL SHOOK

/AGE:  1960 (Age: 57)  

PATIENT #:  [id number]

ACCESSION #:  [id number]          ALT CASE #:   

SPECIMEN COLLECTION DATE:  2017

SPECIMEN RECEIVED DATE:  2017

   

[facility] - [address] [place] [address] - PHONE: 

[phone]

* * *  END OF REPORT  * * *

## 2018-12-14 ENCOUNTER — HOSPITAL ENCOUNTER (OUTPATIENT)
Dept: HOSPITAL 63 - MAMMO | Age: 58
Discharge: HOME | End: 2018-12-14
Attending: PHYSICIAN ASSISTANT
Payer: COMMERCIAL

## 2018-12-14 DIAGNOSIS — Z12.31: Primary | ICD-10-CM

## 2018-12-14 PROCEDURE — 77067 SCR MAMMO BI INCL CAD: CPT

## 2018-12-14 PROCEDURE — 77063 BREAST TOMOSYNTHESIS BI: CPT

## 2018-12-20 NOTE — RAD
DATE: 12/14/2018 1:30 PM



EXAM: MAMMO STEPHANIE SCREENING BILATERAL



HISTORY: routine screening evaluation 



COMPARISON: Prior mammogram 6/6/2011



Bilateral CC and MLO views of the breasts were performed. Bilateral breast 
tomosynthesis was performed in CC and MLO projections.



This study was interpreted with the benefit of Computerized Aided Detection (CAD
).



Breast Density: The breast parenchyma shows scattered fibroglandular densities. 
Breast parenchyma level B.



FINDINGS:

Skin markers overlie the breasts. 

No suspicious masses, microcalcifications or architectural distortion is 
present to suggest malignancy in either breast.





The visualized axillae are unremarkable. 



IMPRESSION: No mammographic evidence of malignancy. 



BI-RADS CATEGORY: 1 NEGATIVE



RECOMMENDED FOLLOW-UP: 12M 12 MONTH FOLLOW-UP Annual screening mammography is 
recommended, unless clinically indicated sooner based on symptoms or change in 
physical exam.



PQRS compliance statement: Patient information was entered into a reminder 
system with a target due date 12/15/2019 for the next mammogram.



Mammography is a sensitive method for finding small breast cancers, but it does 
not detect them all and is not a substitute for careful clinical examination. A 
negative mammogram does not negate a clinically suspicious finding and should 
not result in delay in biopsying a clinically suspicious abnormality.



"Our facility is accredited by the American College of [employer] Mammography 
Program."
[employer]

## 2019-12-11 ENCOUNTER — HOSPITAL ENCOUNTER (OUTPATIENT)
Dept: HOSPITAL 63 - PMG | Age: 59
Discharge: HOME | End: 2019-12-11
Attending: PHYSICIAN ASSISTANT
Payer: COMMERCIAL

## 2019-12-11 DIAGNOSIS — M25.851: ICD-10-CM

## 2019-12-11 DIAGNOSIS — M46.07: ICD-10-CM

## 2019-12-11 DIAGNOSIS — M16.11: Primary | ICD-10-CM

## 2019-12-11 DIAGNOSIS — M47.817: ICD-10-CM

## 2019-12-11 PROCEDURE — 73502 X-RAY EXAM HIP UNI 2-3 VIEWS: CPT

## 2019-12-11 NOTE — RAD
2 view study right hip and AP view of the pelvis

 

Clinical indications: Right hip pain.

 

FINDINGS: No acute fracture or dislocation or lytic process evident. No 

significant arthritic change is evident.

 

IMPRESSION: No significant osseous abnormality of the right hip joint is 

seen.

 

There is degenerative osteoarthritis with joint space narrowing and 

spurring of the symphysis pubis. In addition, there is a prominent 

exostosis involving the superior aspect of the sacrum. This may related to

degenerative osteoarthritis and spurring of the lumbosacral junction. 

Previous CT study of the abdomen and pelvis dated December 16, 2017 

demonstrated a grade 2 anterolisthesis of L5-S1 secondary to bilateral 

spondylolysis of L5 with prominent degenerative spurring of the anterior 

aspect of the L5-S1 disc space.

 

Electronically signed by: Damián Almanzar MD (12/11/2019 5:26 PM) 

[id number]

## 2021-12-29 ENCOUNTER — HOSPITAL ENCOUNTER (OUTPATIENT)
Dept: HOSPITAL 63 - RAD | Age: 61
End: 2021-12-29
Attending: PHYSICIAN ASSISTANT
Payer: COMMERCIAL

## 2021-12-29 DIAGNOSIS — M48.07: ICD-10-CM

## 2021-12-29 DIAGNOSIS — Z90.49: ICD-10-CM

## 2021-12-29 DIAGNOSIS — M47.27: Primary | ICD-10-CM

## 2021-12-29 DIAGNOSIS — M43.17: ICD-10-CM

## 2021-12-29 PROCEDURE — 72110 X-RAY EXAM L-2 SPINE 4/>VWS: CPT

## 2021-12-29 NOTE — RAD
EXAM: Lumbar spine, 5 views.



HISTORY: Pain.



COMPARISON: None.



FINDINGS: 5 views of the lumbar spine are obtained. There is grade 1 anterolisthesis of L5 on S1, disha
suring 12 mm. There are associated pars defects at this level. The posterior elements of L5 are conge
nitally nonfused. There is 5 mm grade 1 anterolisthesis of L4 on L5. There is degenerative endplate r
emodeling with disc space narrowing and facet arthropathy at L5-S1. There are cholecystectomy clips.



IMPRESSION: 

1. Grade 1 anterolisthesis with pars defects and advanced degenerative change at L5-S1.

2. Grade 1 anterolisthesis of L4 on L5.



Electronically signed by: Nasima Gonzalez MD (12/29/2021 2:36 PM) [id number]

## 2022-01-04 ENCOUNTER — HOSPITAL ENCOUNTER (OUTPATIENT)
Dept: HOSPITAL 61 - KCIC MRI | Age: 62
End: 2022-01-04
Payer: COMMERCIAL

## 2022-01-04 DIAGNOSIS — G95.89: ICD-10-CM

## 2022-01-04 DIAGNOSIS — D18.09: ICD-10-CM

## 2022-01-04 DIAGNOSIS — M43.17: ICD-10-CM

## 2022-01-04 DIAGNOSIS — M48.07: ICD-10-CM

## 2022-01-04 DIAGNOSIS — M25.552: ICD-10-CM

## 2022-01-04 DIAGNOSIS — M48.8X7: ICD-10-CM

## 2022-01-04 DIAGNOSIS — M51.26: ICD-10-CM

## 2022-01-04 DIAGNOSIS — M47.26: Primary | ICD-10-CM

## 2022-01-04 DIAGNOSIS — M51.37: ICD-10-CM

## 2022-01-04 DIAGNOSIS — M62.81: ICD-10-CM

## 2022-01-04 PROCEDURE — 72148 MRI LUMBAR SPINE W/O DYE: CPT

## 2022-01-04 NOTE — KCIC
EXAMINATION: Magnetic resonance imaging (MRI) of the lumbar spine without contrast 1/4/2022 10:16 AM



HISTORY: Radicular pain. Weakness of the left leg.



TECHNIQUE: Multiplanar multi-weighted MRI of the lumbar spine was performed without intravenous contr
ast using the standard lumbar spine protocol.



Contrast information:

None administered.



COMPARISON: None available.



FINDINGS:



There is 2 mm retrolisthesis of L1 on L2. There is 1.2 cm anterolisthesis of L5 on S1 with chronic bi
lateral L5 pars defects. Vertebral bodies demonstrate normal signal intensity on all sequences.  Ther
e are no compression fractures.  The conus medullaris terminates at the level of L1. The distal spina
l cord signal intensity is normal.  There is moderate to advanced disc height loss L5-S1. Disc desicc
ation at all levels of lumbar spine. Mild disc height loss at L4-L5 with annular fissure. Simple cyst
 identified in the inferior pole the left kidney measuring 1.9 cm. Abdominal aorta is normal in calib
er. Osseous hemangioma identified involving the right iliac bone. The aorta is normal. There is an os
seous hemangioma identified involving the right L1 pedicle.



L1-L2: There is mild disc bulge. No significant facet arthropathy. Mild bilateral neuroforaminal sten
osis, left greater than right. There is no spinal canal stenosis.



L2-L3: Disc is normal in configuration. No significant facet arthropathy. There is an extradural mass
 centered within the left neural foramen measuring 1.2 x 1.7 x 3.1 cm (AP by transverse by craniocaud
al). This is incompletely evaluated without intravenous contrast. There is mass effect on the left la
teral recess with involvement of the left lateral epidural space. The mass extends from the mid L2 ve
rtebral level to the mid L3 vertebral level. Differential consideration would include sequestered dis
c versus peripheral nerve sheath tumor.



L3-L4: Disc is normal in configuration. No significant facet arthropathy. No neuroforaminal or spinal
 canal stenosis.



L4-L5: L4-L5: Mild disc bulge. Moderate facet arthropathy. Mild to moderate bilateral neuroforaminal 
stenosis, right greater than left. No significant spinal canal stenosis.



L5-S1: There is uncovering of the disc secondary to anterolisthesis. Severe facet arthropathy. Mild n
euroforaminal stenosis. No spinal canal stenosis.



IMPRESSION:



Mild to moderate degenerative changes of the lumbar spine as described in detail above.



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
he mid L2 level to the mid L3 level. The mass measures 1.2 x 1.7 x 3.1 cm and is incompletely evaluat
ed without intravenous contrast. Differential considerations would include sequestered disc versus pe
ripheral nerve sheath tumor. There is associated mass effect on left lateral recess. 



Electronically signed by: Valerie Jovel MD (1/4/2022 2:33 PM) [id number]

## 2022-01-31 ENCOUNTER — HOSPITAL ENCOUNTER (OUTPATIENT)
Dept: HOSPITAL 61 - KCIC MRI | Age: 62
End: 2022-01-31
Attending: NEUROLOGICAL SURGERY
Payer: COMMERCIAL

## 2022-01-31 DIAGNOSIS — M43.8X7: ICD-10-CM

## 2022-01-31 DIAGNOSIS — M51.26: ICD-10-CM

## 2022-01-31 DIAGNOSIS — D49.2: Primary | ICD-10-CM

## 2022-01-31 DIAGNOSIS — M48.8X6: ICD-10-CM

## 2022-01-31 DIAGNOSIS — M48.07: ICD-10-CM

## 2022-01-31 DIAGNOSIS — M43.16: ICD-10-CM

## 2022-01-31 DIAGNOSIS — M47.26: ICD-10-CM

## 2022-01-31 PROCEDURE — 72158 MRI LUMBAR SPINE W/O & W/DYE: CPT

## 2022-01-31 NOTE — KCIC
EXAMINATION: Magnetic resonance imaging (MRI) of the lumbar spine with and without contrast 1/31/2022
 10:48 AM



HISTORY: Low back pain.



TECHNIQUE: Multiplanar multi-weighted MRI of the lumbar spine was performed without the intravenous c
ontrast using the standard lumbar spine protocol.



Contrast information:

Gadolinium based contrast.



COMPARISON: MRI lumbar spine 1/4/2022



FINDINGS:



There is 2 mm retrolisthesis of L1 on L2. There is a millimeter anterolisthesis of L5 on S1. Vertebra
l bodies demonstrate normal signal intensity on all sequences.  There are no compression fractures.  
The conus medullaris terminates at the level of L1. The distal spinal cord signal intensity is normal
.  There is advanced disc height loss at L5-S1. There is a left renal cyst measuring 1.6 cm. Abdomina
l aorta is normal in caliber. Presents portions of the sacrum appear intact. Areas of fatty marrow no
amaury within the sacrum. There is an extra medullary, extradural enhancing lesion at the L2-L3 vertebra
l level which extends into the left neural foramen and measures approximately 6 x 14 x 14 mm.



T12-L1: The disc is normal in configuration. There is no facet arthropathy. There is no neuroforamina
l stenosis. There is no spinal canal stenosis.



L1-L2: Mild disc bulge. No significant facet arthropathy. Mild neuroforaminal stenosis. No spinal can
al stenosis.



L2-L3: Disc is normal in configuration. No significant facet arthropathy. Moderate left neuroforamina
l stenosis secondary to extra medullary mass.



L3-L4: Disc is normal in configuration. No significant facet arthropathy. No neuroforaminal or spinal
 canal stenosis.



L4-L5: There is a circumferential disc bulge with central disc protrusion. Moderate facet arthropathy
. Moderate bilateral neural foraminal stenosis, right greater than left. No significant spinal canal 
stenosis.



L5-S1: There is uncovering of the disc secondary to anterolisthesis. Moderate right and mild left sushila
roforaminal stenosis. No spinal canal stenosis. Chronic bilateral L5 pars defects.



IMPRESSION:

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 Dif
ferential constrictions would include a peripheral nerve sheath tumor versus sequestered disc.



Mild to moderate degenerative changes of the lumbar spine as described in detail above.



Electronically signed by: Valerie Jovel MD (1/31/2022 2:05 PM) [id number]

## 2022-02-21 ENCOUNTER — HOSPITAL ENCOUNTER (OUTPATIENT)
Dept: HOSPITAL 61 - SURGPAT | Age: 62
End: 2022-02-21
Attending: NEUROLOGICAL SURGERY
Payer: COMMERCIAL

## 2022-02-21 VITALS — DIASTOLIC BLOOD PRESSURE: 70 MMHG | SYSTOLIC BLOOD PRESSURE: 154 MMHG

## 2022-02-21 DIAGNOSIS — M51.16: ICD-10-CM

## 2022-02-21 DIAGNOSIS — Z01.812: Primary | ICD-10-CM

## 2022-02-21 LAB
ALBUMIN SERPL-MCNC: 3.6 G/DL (ref 3.4–5)
ALBUMIN/GLOB SERPL: 1.1 {RATIO} (ref 1–1.7)
ALP SERPL-CCNC: 82 U/L (ref 46–116)
ALT SERPL-CCNC: 27 U/L (ref 14–59)
ANION GAP SERPL CALC-SCNC: 9 MMOL/L (ref 6–14)
AST SERPL-CCNC: 17 U/L (ref 15–37)
BASOPHILS # BLD AUTO: 0.1 X10^3/UL (ref 0–0.2)
BASOPHILS NFR BLD: 1 % (ref 0–3)
BILIRUB SERPL-MCNC: 0.5 MG/DL (ref 0.2–1)
BUN SERPL-MCNC: 11 MG/DL (ref 7–20)
BUN/CREAT SERPL: 14 (ref 6–20)
CALCIUM SERPL-MCNC: 8.9 MG/DL (ref 8.5–10.1)
CHLORIDE SERPL-SCNC: 105 MMOL/L (ref 98–107)
CO2 SERPL-SCNC: 28 MMOL/L (ref 21–32)
CREAT SERPL-MCNC: 0.8 MG/DL (ref 0.6–1)
EOSINOPHIL NFR BLD: 0.3 X10^3/UL (ref 0–0.7)
EOSINOPHIL NFR BLD: 4 % (ref 0–3)
ERYTHROCYTE [DISTWIDTH] IN BLOOD BY AUTOMATED COUNT: 14.6 % (ref 11.5–14.5)
GFR SERPLBLD BASED ON 1.73 SQ M-ARVRAT: 73.2 ML/MIN
GLUCOSE SERPL-MCNC: 91 MG/DL (ref 70–99)
HCT VFR BLD CALC: 41.2 % (ref 36–47)
HGB BLD-MCNC: 13.6 G/DL (ref 12–15.5)
LYMPHOCYTES # BLD: 3.3 X10^3/UL (ref 1–4.8)
LYMPHOCYTES NFR BLD AUTO: 42 % (ref 24–48)
MCH RBC QN AUTO: 31 PG (ref 25–35)
MCHC RBC AUTO-ENTMCNC: 33 G/DL (ref 31–37)
MCV RBC AUTO: 94 FL (ref 79–100)
MONO #: 0.6 X10^3/UL (ref 0–1.1)
MONOCYTES NFR BLD: 8 % (ref 0–9)
NEUT #: 3.5 X10^3/UL (ref 1.8–7.7)
NEUTROPHILS NFR BLD AUTO: 45 % (ref 31–73)
PLATELET # BLD AUTO: 312 X10^3/UL (ref 140–400)
POTASSIUM SERPL-SCNC: 4.2 MMOL/L (ref 3.5–5.1)
PROT SERPL-MCNC: 7 G/DL (ref 6.4–8.2)
RBC # BLD AUTO: 4.38 X10^6/UL (ref 3.5–5.4)
SODIUM SERPL-SCNC: 142 MMOL/L (ref 136–145)
WBC # BLD AUTO: 7.8 X10^3/UL (ref 4–11)

## 2022-02-21 PROCEDURE — 80053 COMPREHEN METABOLIC PANEL: CPT

## 2022-02-21 PROCEDURE — 36415 COLL VENOUS BLD VENIPUNCTURE: CPT

## 2022-02-21 PROCEDURE — 85025 COMPLETE CBC W/AUTO DIFF WBC: CPT

## 2022-02-21 PROCEDURE — 87641 MR-STAPH DNA AMP PROBE: CPT

## 2022-02-22 NOTE — PREOP HP
DATE OF SERVICE: 02/23/2022

HISTORY OF PRESENT ILLNESS:  The patient is a pleasant 61-year-old who on 

12/15/2021 spontaneously developed extremely severe left-sided lower back pain 

along with pain in her left hip, left thigh and anterior thigh and anterior leg.

 She says she has numbness in the anterior leg, which has persisted from that 

time to the present.  Her left leg feels weak and can give out.  She has fallen.

 There were no problems on the right side.  She rates her pain 5/10 with pain 

medication.  Activity increases her pain.  Lying down and heating pad can help. 

She takes hydrocodone and gabapentin.  She has been off work due to this 

problem.  She tried chiropractic treatment, which made her worse.



CURRENT MEDICATIONS:  Ibuprofen, gabapentin and hydrocodone.



PAST MEDICAL HISTORY:  Arthritis, headaches.



PAST SURGICAL HISTORY:  Cholecystectomy, left knee surgery, amputation of right 

index finger.



FAMILY HISTORY:  Cancer, diabetes.



SOCIAL HISTORY:  Employed at [employer].  .  Does not smoke.  Drinks 

alcohol 1-2 times per year.



ALLERGIES:  No known drug allergies.



REVIEW OF SYSTEMS:  A 12-point review of systems was performed and is 

noncontributory except that mentioned above.



PHYSICAL EXAMINATION:

GENERAL:  Alert, pleasant, in no acute distress.

HEENT:  Head is normocephalic, atraumatic.

SKIN:  Warm and dry.

MUSCULOSKELETAL:  Lumbar paraspinal muscle bulk is normal, restricted range of 

motion of the lumbar spine, mild-to-moderate tenderness of the lower lumbar 

spine with palpation, normal range of motion of the lower extremities 

bilaterally.

EXTREMITIES:  No clubbing, cyanosis or edema.

NEUROLOGIC:  Alert and oriented x 3.  Strength is 5/5 in the lower extremities 

bilaterally except for left hip flexor, which was 2/5 and left quadriceps was 

4-/5, sensory is intact to light touch in the lower extremities except for 

decreased light touch in the anterior thigh, stopping just below the left knee. 

Reflexes were present and symmetric in the lower extremities bilaterally, 

negative straight leg raising, slow antalgic gait favoring her left leg.



IMAGING:  I reviewed a contrast and noncontrast MRI scan.  At L2-L3, there is a 

large extradural mass on the left side of the canal, which measures 3.1 x 1.7 x 

1.2 cm.  There is mass effect on the left side of the canal and the nerve roots 

on the left.  Some of the mass does extend into the foramen on the left side.  

It extends basically from the mid L2 level to the mid L3 level.



ASSESSMENT AND PLAN:  This is an unusual appearing mass; however, I believe this

is a herniated disk, more likely than a nerve sheath tumor.  I have recommended 

a microdiskectomy at L2-L3 on the left.  We spoke about the technique, the risk 

and the expected postoperative course.  She would like to go ahead.  We are 

going to make the arrangements.







PAUL

DR: Sully   DD: 02/22/2022 10:59

DT: 02/22/2022 11:22   TID: [id number]

## 2022-02-23 ENCOUNTER — HOSPITAL ENCOUNTER (OUTPATIENT)
Dept: HOSPITAL 61 - SURG | Age: 62
Setting detail: OBSERVATION
LOS: 1 days | Discharge: HOME | End: 2022-02-24
Attending: NEUROLOGICAL SURGERY | Admitting: NEUROLOGICAL SURGERY
Payer: COMMERCIAL

## 2022-02-23 VITALS — DIASTOLIC BLOOD PRESSURE: 62 MMHG | SYSTOLIC BLOOD PRESSURE: 127 MMHG

## 2022-02-23 VITALS — SYSTOLIC BLOOD PRESSURE: 139 MMHG | DIASTOLIC BLOOD PRESSURE: 64 MMHG

## 2022-02-23 VITALS — WEIGHT: 160.28 LBS | HEIGHT: 65 IN | BODY MASS INDEX: 26.7 KG/M2

## 2022-02-23 VITALS — SYSTOLIC BLOOD PRESSURE: 108 MMHG | DIASTOLIC BLOOD PRESSURE: 58 MMHG

## 2022-02-23 VITALS — DIASTOLIC BLOOD PRESSURE: 95 MMHG | SYSTOLIC BLOOD PRESSURE: 196 MMHG

## 2022-02-23 DIAGNOSIS — M19.90: ICD-10-CM

## 2022-02-23 DIAGNOSIS — Z90.49: ICD-10-CM

## 2022-02-23 DIAGNOSIS — Z79.899: ICD-10-CM

## 2022-02-23 DIAGNOSIS — M51.26: ICD-10-CM

## 2022-02-23 DIAGNOSIS — M51.16: Primary | ICD-10-CM

## 2022-02-23 DIAGNOSIS — Z98.890: ICD-10-CM

## 2022-02-23 DIAGNOSIS — R51.9: ICD-10-CM

## 2022-02-23 PROCEDURE — 63035 LAMOT DCMPRN NRV RT EA ADDL: CPT

## 2022-02-23 PROCEDURE — A6254 ABSORPT DRG <=16 SQ IN W/BDR: HCPCS

## 2022-02-23 PROCEDURE — 97530 THERAPEUTIC ACTIVITIES: CPT

## 2022-02-23 PROCEDURE — 76000 FLUOROSCOPY <1 HR PHYS/QHP: CPT

## 2022-02-23 PROCEDURE — G0379 DIRECT REFER HOSPITAL OBSERV: HCPCS

## 2022-02-23 PROCEDURE — A4364 ADHESIVE, LIQUID OR EQUAL: HCPCS

## 2022-02-23 PROCEDURE — A6258 TRANSPARENT FILM >16<=48 IN: HCPCS

## 2022-02-23 PROCEDURE — 97116 GAIT TRAINING THERAPY: CPT

## 2022-02-23 PROCEDURE — G0378 HOSPITAL OBSERVATION PER HR: HCPCS

## 2022-02-23 PROCEDURE — 63030 LAMOT DCMPRN NRV RT 1 LMBR: CPT

## 2022-02-23 PROCEDURE — A4930 STERILE, GLOVES PER PAIR: HCPCS

## 2022-02-23 PROCEDURE — 97162 PT EVAL MOD COMPLEX 30 MIN: CPT

## 2022-02-23 RX ADMIN — DEXTROSE, SODIUM CHLORIDE, AND POTASSIUM CHLORIDE SCH MLS/HR: 5; .45; .15 INJECTION INTRAVENOUS at 16:03

## 2022-02-23 RX ADMIN — HYDROCODONE BITARTRATE AND ACETAMINOPHEN PRN TAB: 5; 325 TABLET ORAL at 16:18

## 2022-02-23 NOTE — NUR
Admit s/p LMD L2-L3, pt A& O x3, sitting in recliner.  States she had a dinner tray in PACU. 
 Dressing is C/D/I.  Ice pack placed, IVF running, call light in reach.  Denies pain, 
weakness or numbness/tingling.

## 2022-02-23 NOTE — OP
DATE OF SURGERY: 02/23/2022

PREOPERATIVE DIAGNOSIS:  Large herniated lumbar disc with left extradural 

component as well as a foraminal component compressing the left L2 and L3 nerve 

roots at L2-L3, left.



POSTOPERATIVE DIAGNOSIS:  Large herniated lumbar disc with left extradural 

component as well as a foraminal component compressing the left L2 and L3 nerve 

roots at L2-L3, left.



OPERATIONS PERFORMED:  Hemilaminotomy followed by a transfacet exposure with 

microdiscectomy, removing epidural disc from lateral to the nerve root, inferior

to the nerve root at L2-L3 along with disc, which followed the L2 root out 

through the foramen.  The operation was done with EMG monitoring, fluoroscopy, 

microscopic dissection, SSEP monitoring.



SURGEON: Nathaniel Maher M.D.



ASSISTANT:  YOEL Duke, assisted with the surgery.  She assisted with 

the microdecompression and discectomy as well as the closure.



OPERATIVE INDICATIONS:  The patient is a pleasant 61-year-old woman who 

developed intractable back and left leg pain, which failed conservative 

measures.  On imaging studies, there was a question of whether this was a nerve 

sheath tumor versus a very large herniated disc.  We studied her and the studies

were inconclusive.  I recommended surgery to decompress the root as well as make

a diagnosis and she understood the rationale for surgery and wished to go ahead.



DESCRIPTION OF PROCEDURE:  Following general endotracheal anesthesia, the 

patient was positioned prone on Dmitry table.  Lumbar region prepped and draped

in standard fashion.  DUARTE hose and AV impulse boots were applied for DVT 

prophylaxis.  A microscope was draped, fluoroscopy was draped and brought into 

the field.  Monitoring was established.  Ancef 2 grams was given less than 1 

hour prior to the initiation of surgery.  Using fluoroscopic guidance, a midline

posterior incision was made.  I dissected down through skin and subcutaneous 

tissue, reflected the paraspinal muscles and placed a Fishtail microdisk 

retractor.  I brought in the microscope and using a high-speed air drill, I 

burred down a generous hemilaminotomy and then worked superiorly and inferiorly,

performed a generous partial foraminotomy.  I pulled thickened ligamentum flavum

away.  There was a disc lateral to the dura and I removed this and as I followed

inferiorly, there was a large fragment of disc, which was subligamentous at this

point and markedly compressing the root as it entered the foramen.  I gently 

worked through this region.  I carefully identified the root.  I then removed 

this disc.  There was also disc that worked out laterally into the foramen and I

shaved and followed the foramen out laterally and then followed the L2 

root out, retracted it and safety removed multiple disc fragments 

from this location, which were densely scarred. As I worked, the area became 

very well decompressed.  I explored carefully.  There were no retained 

fragments.  I could follow both the L2 root and L3 root out and beneath the 

dura.  There was no evidence of further disc material.  I did enter the disc

space and performed discectomy as well.  I felt that the region was very clear. 

Hemostasis was excellent.  I did use a bipolar cautery where necessary, had 

small amounts of bone wax.  I then irrigated copiously, removed the retractors, 

irrigated again, obtained hemostasis in the muscle and I closed the wound in 

layers with absorbable suture.  The skin was closed with 4-0 subcuticular 
stitch.  I

felt surgery went very well.







ARTURO/ZULEIKA

DR: Jo   DD: 02/23/2022 14:49

DT: 02/23/2022 15:26   TID: [id number]

ISSAC

## 2022-02-24 VITALS — SYSTOLIC BLOOD PRESSURE: 124 MMHG | DIASTOLIC BLOOD PRESSURE: 54 MMHG

## 2022-02-24 VITALS — DIASTOLIC BLOOD PRESSURE: 70 MMHG | SYSTOLIC BLOOD PRESSURE: 118 MMHG

## 2022-02-24 RX ADMIN — DOCUSATE SODIUM SCH MG: 100 CAPSULE, LIQUID FILLED ORAL at 08:29

## 2022-02-24 RX ADMIN — DOCUSATE SODIUM SCH MG: 100 CAPSULE, LIQUID FILLED ORAL at 02:31

## 2022-02-24 RX ADMIN — DEXTROSE, SODIUM CHLORIDE, AND POTASSIUM CHLORIDE SCH MLS/HR: 5; .45; .15 INJECTION INTRAVENOUS at 04:35

## 2022-02-24 RX ADMIN — HYDROCODONE BITARTRATE AND ACETAMINOPHEN PRN TAB: 5; 325 TABLET ORAL at 02:31

## 2022-02-24 NOTE — DISCH
DISCHARGE INSTRUCTIONS


Condition on Discharge


Condition on Discharge:  Stable





Activity After Discharge


Activity Instructions for Disc:  Activity as tolerated, Avoid exertion


Other activity instructions:  no driving for a week


Bathing Instructions:  Shower-keep dressing dry, No Tub Bath until see 


Lifting Instructions after Dis:  No heavy lifting, No pulling or pushing, Do not

lift >10 pounds


Driving Instructions after Dis:  No driving for 2 weeks


Weight Bearing Status after Di:  No restrictions





Diet after Discharge


Diet after Discharge:  Low Fat, Regular


Additional Diet Restrictions:  resume home diet


Diet Texture:  Regular


Liquid Texture:  Thin Liquid


Swallowing Supervision:  None needed





Wound Incision Care


Wound/Incision Care:  Ice to area for comfort, Change dressing, Reinforce 

dressing PRN


Other wound/incision instructi:  ok to remove dressing in 48 hours if dry, no 

soaking


Wound Care Equipment:  Dressings





Contacting the [facility] after DC


Call your doctor for:  Concerns you may have





Follow-Up


Follow up with:  Dr. Zimmer's nurse in 2 weeks [phone]











ROX ZIMMER MD            Feb 24, 2022 09:44

## 2022-02-28 NOTE — PATHOLOGY
[facility] Accession Number: [id number]

.                                                                01

Material submitted:                                        .

PART A: vertebral column - LUMBAR DISC

PART B: vertebral column - LUMBAR DECOMPRESSION

.                                                                01

Clinical history:                                          .

LUMBAR HERNIATED DISC WITH RADICULOPATHY

LUMBAR MICRODISCECTOMY L2-3

.                                                                02

**********************************************************************

Diagnosis:

A.  Segments of fibrocartilaginous, adipose and skeletal muscle tissue and

minute segments of bone, lumbar disc

- Degenerative changes of fibrocartilaginous tissue.

.

B.  Segments of fibrocartilaginous, adipose, skeletal muscle, and synovial

tissue and bone, lumbar decompression:

- Degenerative changes of fibrocartilaginous tissue.

.

(JPM:allison; 02/28/2022)

[facility]  02/28/2022  1535 Local

**********************************************************************

.                                                                02

Comment:

There is no evidence of an acute inflammatory process or malignancy.

(AGUSTINAM:allison; 02/28/2022)

.                                                                02

Electronically signed:                                     .

Ashok Lyn MD, Pathologist

NPI- [id number]

.                                                                01

Gross description:                                         .

A.  The specimen is received in formalin, labeled "[place], Sania, lumbar

disc".  Received are multiple segments of pale tan to pink-tan fibrous

tissue admixed with fragments of gritty bone measuring 2.2 x 1.3 x 0.1 cm

in aggregate dimensions.  The specimen is submitted representative in

cassette A1, following light decalcification.

.

B.  The specimen is received in formalin, labeled "[place], Sania, lumbar

decompression".  Received are multiple segments of pale tan to pink-tan

fibrous tissue admixed with fragments of gritty bone measuring 3.2 x 3.0 x

0.5 cm in aggregate dimensions.  The specimen is submitted representative

in cassette A1, following light decalcification.

([facility]; 2/25/2022)

[facility]  02/25/2022  1036 Local

.                                                                02

Pathologist provided ICD-10:

M51.26

.                                                                02

CPT                                                        .

147826, [id number], [id number], [id number]

Specimen Comment: A courtesy copy of this report has been sent to [phone], [phone]

Specimen Comment: [id number]

Specimen Comment: Report sent to  / DR ORTIZ

Specimen Comment: A duplicate report has been generated due to demographic updates.

***Performed at:  01

   [facility]

   [address]  [id number]

   MD Jose Harrison MD Phone:  [phone]

***Performed at:  02

   [facility]

   [address] [place], [place]  [id number]

   MD Ashok Lyn MD Phone:  [phone]